# Patient Record
Sex: MALE | Race: WHITE | Employment: FULL TIME | URBAN - METROPOLITAN AREA
[De-identification: names, ages, dates, MRNs, and addresses within clinical notes are randomized per-mention and may not be internally consistent; named-entity substitution may affect disease eponyms.]

---

## 2018-01-24 PROBLEM — M24.051 LOOSE BODY IN RIGHT HIP: Status: RESOLVED | Noted: 2017-02-13 | Resolved: 2018-01-24

## 2018-01-24 PROBLEM — S83.271A COMPLEX TEAR OF LATERAL MENISCUS OF RIGHT KNEE AS CURRENT INJURY: Status: ACTIVE | Noted: 2017-11-17

## 2018-01-24 PROBLEM — M24.051 LOOSE BODY IN RIGHT HIP: Status: ACTIVE | Noted: 2017-02-13

## 2018-03-22 PROBLEM — M25.9 MULTIPLE JOINT COMPLAINTS: Status: ACTIVE | Noted: 2018-03-22

## 2018-03-22 PROBLEM — R21 RASH AND NONSPECIFIC SKIN ERUPTION: Status: ACTIVE | Noted: 2018-03-22

## 2018-03-22 PROBLEM — R76.8 CYCLIC CITRULLINATED PEPTIDE (CCP) ANTIBODY POSITIVE: Status: ACTIVE | Noted: 2018-03-22

## 2019-01-28 ENCOUNTER — HOSPITAL ENCOUNTER (OUTPATIENT)
Dept: LAB | Age: 49
Discharge: HOME | End: 2019-01-28

## 2019-01-28 PROCEDURE — 88305 TISSUE EXAM BY PATHOLOGIST: CPT

## 2019-01-30 PROBLEM — K44.9 HIATAL HERNIA: Status: ACTIVE | Noted: 2019-01-30

## 2019-04-18 PROBLEM — R39.16 BENIGN PROSTATIC HYPERPLASIA (BPH) WITH STRAINING ON URINATION: Status: ACTIVE | Noted: 2019-04-18

## 2019-04-18 PROBLEM — R21 RASH AND NONSPECIFIC SKIN ERUPTION: Status: RESOLVED | Noted: 2018-03-22 | Resolved: 2019-04-18

## 2019-04-18 PROBLEM — N40.1 BENIGN PROSTATIC HYPERPLASIA (BPH) WITH STRAINING ON URINATION: Status: ACTIVE | Noted: 2019-04-18

## 2019-04-18 PROBLEM — D12.6 TUBULOVILLOUS ADENOMA POLYP OF COLON: Status: ACTIVE | Noted: 2019-04-18

## 2019-05-03 ENCOUNTER — HOSPITAL ENCOUNTER (OUTPATIENT)
Dept: MRI IMAGING | Age: 49
Discharge: HOME | End: 2019-05-03

## 2019-05-03 DIAGNOSIS — D12.6 TUBULOVILLOUS ADENOMA POLYP OF COLON: ICD-10-CM

## 2019-05-03 DIAGNOSIS — M54.6 CHRONIC MIDLINE THORACIC BACK PAIN: ICD-10-CM

## 2019-05-03 DIAGNOSIS — F17.200 SMOKER: ICD-10-CM

## 2019-05-03 DIAGNOSIS — G89.29 CHRONIC MIDLINE THORACIC BACK PAIN: ICD-10-CM

## 2019-05-03 DIAGNOSIS — R63.4 ABNORMAL WEIGHT LOSS: ICD-10-CM

## 2019-05-04 NOTE — PROGRESS NOTES
Pt was claustrophobic and unable to complete MRI this evening. Informed patient to call ordering physician for further instruction.

## 2019-05-11 ENCOUNTER — HOSPITAL ENCOUNTER (OUTPATIENT)
Dept: CT IMAGING | Age: 49
Discharge: HOME | End: 2019-05-11
Payer: COMMERCIAL

## 2019-05-11 DIAGNOSIS — F17.200 SMOKER: ICD-10-CM

## 2019-05-11 DIAGNOSIS — R63.4 ABNORMAL WEIGHT LOSS: ICD-10-CM

## 2019-05-11 DIAGNOSIS — R76.8 CYCLIC CITRULLINATED PEPTIDE (CCP) ANTIBODY POSITIVE: ICD-10-CM

## 2019-05-11 PROCEDURE — 74011636320 HC RX REV CODE- 636/320: Performed by: PHYSICIAN ASSISTANT

## 2019-05-11 PROCEDURE — 74011000258 HC RX REV CODE- 258: Performed by: PHYSICIAN ASSISTANT

## 2019-05-11 PROCEDURE — 74177 CT ABD & PELVIS W/CONTRAST: CPT

## 2019-05-11 RX ORDER — SODIUM CHLORIDE 0.9 % (FLUSH) 0.9 %
10 SYRINGE (ML) INJECTION
Status: COMPLETED | OUTPATIENT
Start: 2019-05-11 | End: 2019-05-11

## 2019-05-11 RX ADMIN — Medication 10 ML: at 11:10

## 2019-05-11 RX ADMIN — IOPAMIDOL 100 ML: 755 INJECTION, SOLUTION INTRAVENOUS at 11:10

## 2019-05-11 RX ADMIN — DIATRIZOATE MEGLUMINE AND DIATRIZOATE SODIUM 15 ML: 660; 100 LIQUID ORAL; RECTAL at 11:10

## 2019-05-11 RX ADMIN — SODIUM CHLORIDE 100 ML: 900 INJECTION, SOLUTION INTRAVENOUS at 11:10

## 2019-05-12 NOTE — PROGRESS NOTES
Please ask him to come in to discuss/review results of the CT of his abdomen. Basically, normal, but will need a prostate exam due to nodular prostate noted. See MRI of shoulder notes also.

## 2019-05-20 ENCOUNTER — HOSPITAL ENCOUNTER (OUTPATIENT)
Dept: MRI IMAGING | Age: 49
Discharge: HOME | End: 2019-05-20
Payer: COMMERCIAL

## 2019-05-20 PROCEDURE — 72146 MRI CHEST SPINE W/O DYE: CPT

## 2019-07-30 ENCOUNTER — HOSPITAL ENCOUNTER (OUTPATIENT)
Dept: ULTRASOUND IMAGING | Age: 49
Discharge: HOME | End: 2019-07-30
Attending: UROLOGY
Payer: COMMERCIAL

## 2019-07-30 DIAGNOSIS — N50.89 TESTICULAR MASS: ICD-10-CM

## 2019-07-30 PROCEDURE — 76870 US EXAM SCROTUM: CPT

## 2019-08-01 NOTE — PROGRESS NOTES
Called patient with scrotal US results. Scrotal US shows R testicular mass concerning for cancer. I recommended that we proceed with right radical inguinal orchiectomy in the OR. He also will need tumor markers (AFP, HCG, LDH) prior to surgery and these orders were placed today. Surgery scheduler will call him to schedule surgery.

## 2019-08-13 ENCOUNTER — HOSPITAL ENCOUNTER (OUTPATIENT)
Age: 49
Setting detail: OUTPATIENT SURGERY
Discharge: HOME | End: 2019-08-13
Attending: UROLOGY | Admitting: UROLOGY
Payer: COMMERCIAL

## 2019-08-13 ENCOUNTER — ANESTHESIA EVENT (OUTPATIENT)
Dept: SURGERY | Age: 49
End: 2019-08-13
Payer: COMMERCIAL

## 2019-08-13 ENCOUNTER — ANESTHESIA (OUTPATIENT)
Dept: SURGERY | Age: 49
End: 2019-08-13
Payer: COMMERCIAL

## 2019-08-13 VITALS
TEMPERATURE: 97.9 F | RESPIRATION RATE: 19 BRPM | BODY MASS INDEX: 22.95 KG/M2 | SYSTOLIC BLOOD PRESSURE: 117 MMHG | DIASTOLIC BLOOD PRESSURE: 65 MMHG | WEIGHT: 151.44 LBS | HEIGHT: 68 IN | HEART RATE: 75 BPM | OXYGEN SATURATION: 98 %

## 2019-08-13 DIAGNOSIS — N50.89 TESTICULAR MASS: Primary | ICD-10-CM

## 2019-08-13 LAB — HGB BLD-MCNC: 12 G/DL (ref 13.6–17.2)

## 2019-08-13 PROCEDURE — 74011000250 HC RX REV CODE- 250

## 2019-08-13 PROCEDURE — 74011250637 HC RX REV CODE- 250/637: Performed by: ANESTHESIOLOGY

## 2019-08-13 PROCEDURE — 77030019908 HC STETH ESOPH SIMS -A: Performed by: ANESTHESIOLOGY

## 2019-08-13 PROCEDURE — 74011250636 HC RX REV CODE- 250/636: Performed by: ANESTHESIOLOGY

## 2019-08-13 PROCEDURE — 77030039425 HC BLD LARYNG TRULITE DISP TELE -A: Performed by: ANESTHESIOLOGY

## 2019-08-13 PROCEDURE — 77030037088 HC TUBE ENDOTRACH ORAL NSL COVD-A: Performed by: ANESTHESIOLOGY

## 2019-08-13 PROCEDURE — 74011250636 HC RX REV CODE- 250/636

## 2019-08-13 PROCEDURE — 77030002996 HC SUT SLK J&J -A: Performed by: UROLOGY

## 2019-08-13 PROCEDURE — 77030002933 HC SUT MCRYL J&J -A: Performed by: UROLOGY

## 2019-08-13 PROCEDURE — 77030019940 HC BLNKT HYPOTHRM STRY -B: Performed by: ANESTHESIOLOGY

## 2019-08-13 PROCEDURE — 77030018836 HC SOL IRR NACL ICUM -A: Performed by: UROLOGY

## 2019-08-13 PROCEDURE — 76210000006 HC OR PH I REC 0.5 TO 1 HR: Performed by: UROLOGY

## 2019-08-13 PROCEDURE — 88305 TISSUE EXAM BY PATHOLOGIST: CPT

## 2019-08-13 PROCEDURE — 76010000162 HC OR TIME 1.5 TO 2 HR INTENSV-TIER 1: Performed by: UROLOGY

## 2019-08-13 PROCEDURE — 76060000034 HC ANESTHESIA 1.5 TO 2 HR: Performed by: UROLOGY

## 2019-08-13 PROCEDURE — 74011250636 HC RX REV CODE- 250/636: Performed by: UROLOGY

## 2019-08-13 PROCEDURE — 77030031139 HC SUT VCRL2 J&J -A: Performed by: UROLOGY

## 2019-08-13 PROCEDURE — 76210000020 HC REC RM PH II FIRST 0.5 HR: Performed by: UROLOGY

## 2019-08-13 PROCEDURE — 77030032490 HC SLV COMPR SCD KNE COVD -B: Performed by: UROLOGY

## 2019-08-13 PROCEDURE — 77030012406 HC DRN WND PENRS BARD -A: Performed by: UROLOGY

## 2019-08-13 PROCEDURE — 77030039266 HC ADH SKN EXOFIN S2SG -A: Performed by: UROLOGY

## 2019-08-13 PROCEDURE — 85018 HEMOGLOBIN: CPT

## 2019-08-13 PROCEDURE — 74011000250 HC RX REV CODE- 250: Performed by: UROLOGY

## 2019-08-13 RX ORDER — PROPOFOL 10 MG/ML
INJECTION, EMULSION INTRAVENOUS AS NEEDED
Status: DISCONTINUED | OUTPATIENT
Start: 2019-08-13 | End: 2019-08-13 | Stop reason: HOSPADM

## 2019-08-13 RX ORDER — ROCURONIUM BROMIDE 10 MG/ML
INJECTION, SOLUTION INTRAVENOUS AS NEEDED
Status: DISCONTINUED | OUTPATIENT
Start: 2019-08-13 | End: 2019-08-13 | Stop reason: HOSPADM

## 2019-08-13 RX ORDER — ACETAMINOPHEN 500 MG
1000 TABLET ORAL ONCE
Status: COMPLETED | OUTPATIENT
Start: 2019-08-13 | End: 2019-08-13

## 2019-08-13 RX ORDER — KETOROLAC TROMETHAMINE 10 MG/1
10 TABLET, FILM COATED ORAL
Qty: 20 TAB | Refills: 0 | Status: SHIPPED | OUTPATIENT
Start: 2019-08-13 | End: 2019-08-18

## 2019-08-13 RX ORDER — BUPIVACAINE HYDROCHLORIDE AND EPINEPHRINE 5; 5 MG/ML; UG/ML
INJECTION, SOLUTION EPIDURAL; INTRACAUDAL; PERINEURAL AS NEEDED
Status: DISCONTINUED | OUTPATIENT
Start: 2019-08-13 | End: 2019-08-13 | Stop reason: HOSPADM

## 2019-08-13 RX ORDER — DEXAMETHASONE SODIUM PHOSPHATE 4 MG/ML
INJECTION, SOLUTION INTRA-ARTICULAR; INTRALESIONAL; INTRAMUSCULAR; INTRAVENOUS; SOFT TISSUE AS NEEDED
Status: DISCONTINUED | OUTPATIENT
Start: 2019-08-13 | End: 2019-08-13 | Stop reason: HOSPADM

## 2019-08-13 RX ORDER — SODIUM CHLORIDE, SODIUM LACTATE, POTASSIUM CHLORIDE, CALCIUM CHLORIDE 600; 310; 30; 20 MG/100ML; MG/100ML; MG/100ML; MG/100ML
100 INJECTION, SOLUTION INTRAVENOUS CONTINUOUS
Status: DISCONTINUED | OUTPATIENT
Start: 2019-08-13 | End: 2019-08-13 | Stop reason: HOSPADM

## 2019-08-13 RX ORDER — MIDAZOLAM HYDROCHLORIDE 1 MG/ML
INJECTION, SOLUTION INTRAMUSCULAR; INTRAVENOUS AS NEEDED
Status: DISCONTINUED | OUTPATIENT
Start: 2019-08-13 | End: 2019-08-13 | Stop reason: HOSPADM

## 2019-08-13 RX ORDER — AMOXICILLIN 250 MG
1 CAPSULE ORAL DAILY
Qty: 5 TAB | Refills: 1 | Status: SHIPPED | OUTPATIENT
Start: 2019-08-13 | End: 2019-08-18

## 2019-08-13 RX ORDER — KETOROLAC TROMETHAMINE 30 MG/ML
INJECTION, SOLUTION INTRAMUSCULAR; INTRAVENOUS AS NEEDED
Status: DISCONTINUED | OUTPATIENT
Start: 2019-08-13 | End: 2019-08-13 | Stop reason: HOSPADM

## 2019-08-13 RX ORDER — LIDOCAINE HYDROCHLORIDE 10 MG/ML
0.3 INJECTION INFILTRATION; PERINEURAL ONCE
Status: DISCONTINUED | OUTPATIENT
Start: 2019-08-13 | End: 2019-08-13 | Stop reason: HOSPADM

## 2019-08-13 RX ORDER — HYDROCODONE BITARTRATE AND ACETAMINOPHEN 5; 325 MG/1; MG/1
1 TABLET ORAL
Qty: 10 TAB | Refills: 0 | Status: SHIPPED | OUTPATIENT
Start: 2019-08-13 | End: 2019-08-16

## 2019-08-13 RX ORDER — LIDOCAINE HYDROCHLORIDE 20 MG/ML
INJECTION, SOLUTION EPIDURAL; INFILTRATION; INTRACAUDAL; PERINEURAL AS NEEDED
Status: DISCONTINUED | OUTPATIENT
Start: 2019-08-13 | End: 2019-08-13 | Stop reason: HOSPADM

## 2019-08-13 RX ORDER — HYDROMORPHONE HYDROCHLORIDE 2 MG/ML
0.5 INJECTION, SOLUTION INTRAMUSCULAR; INTRAVENOUS; SUBCUTANEOUS
Status: DISCONTINUED | OUTPATIENT
Start: 2019-08-13 | End: 2019-08-13 | Stop reason: HOSPADM

## 2019-08-13 RX ORDER — GLYCOPYRROLATE 0.2 MG/ML
INJECTION INTRAMUSCULAR; INTRAVENOUS AS NEEDED
Status: DISCONTINUED | OUTPATIENT
Start: 2019-08-13 | End: 2019-08-13 | Stop reason: HOSPADM

## 2019-08-13 RX ORDER — ONDANSETRON 2 MG/ML
INJECTION INTRAMUSCULAR; INTRAVENOUS AS NEEDED
Status: DISCONTINUED | OUTPATIENT
Start: 2019-08-13 | End: 2019-08-13 | Stop reason: HOSPADM

## 2019-08-13 RX ORDER — CEFAZOLIN SODIUM/WATER 2 G/20 ML
2 SYRINGE (ML) INTRAVENOUS
Status: COMPLETED | OUTPATIENT
Start: 2019-08-13 | End: 2019-08-13

## 2019-08-13 RX ORDER — OXYCODONE HYDROCHLORIDE 5 MG/1
10 TABLET ORAL
Status: DISCONTINUED | OUTPATIENT
Start: 2019-08-13 | End: 2019-08-13 | Stop reason: HOSPADM

## 2019-08-13 RX ORDER — NEOSTIGMINE METHYLSULFATE 1 MG/ML
INJECTION, SOLUTION INTRAVENOUS AS NEEDED
Status: DISCONTINUED | OUTPATIENT
Start: 2019-08-13 | End: 2019-08-13 | Stop reason: HOSPADM

## 2019-08-13 RX ORDER — EPHEDRINE SULFATE 50 MG/ML
INJECTION, SOLUTION INTRAVENOUS AS NEEDED
Status: DISCONTINUED | OUTPATIENT
Start: 2019-08-13 | End: 2019-08-13 | Stop reason: HOSPADM

## 2019-08-13 RX ORDER — FENTANYL CITRATE 50 UG/ML
INJECTION, SOLUTION INTRAMUSCULAR; INTRAVENOUS AS NEEDED
Status: DISCONTINUED | OUTPATIENT
Start: 2019-08-13 | End: 2019-08-13 | Stop reason: HOSPADM

## 2019-08-13 RX ORDER — MIDAZOLAM HYDROCHLORIDE 1 MG/ML
2 INJECTION, SOLUTION INTRAMUSCULAR; INTRAVENOUS
Status: DISCONTINUED | OUTPATIENT
Start: 2019-08-13 | End: 2019-08-13 | Stop reason: HOSPADM

## 2019-08-13 RX ADMIN — KETOROLAC TROMETHAMINE 30 MG: 30 INJECTION, SOLUTION INTRAMUSCULAR; INTRAVENOUS at 14:23

## 2019-08-13 RX ADMIN — NEOSTIGMINE METHYLSULFATE 3 MG: 1 INJECTION, SOLUTION INTRAVENOUS at 14:35

## 2019-08-13 RX ADMIN — EPHEDRINE SULFATE 10 MG: 50 INJECTION, SOLUTION INTRAVENOUS at 14:12

## 2019-08-13 RX ADMIN — DEXAMETHASONE SODIUM PHOSPHATE 8 MG: 4 INJECTION, SOLUTION INTRA-ARTICULAR; INTRALESIONAL; INTRAMUSCULAR; INTRAVENOUS; SOFT TISSUE at 13:18

## 2019-08-13 RX ADMIN — SODIUM CHLORIDE, SODIUM LACTATE, POTASSIUM CHLORIDE, AND CALCIUM CHLORIDE: 600; 310; 30; 20 INJECTION, SOLUTION INTRAVENOUS at 14:45

## 2019-08-13 RX ADMIN — ACETAMINOPHEN 1000 MG: 500 TABLET, FILM COATED ORAL at 10:44

## 2019-08-13 RX ADMIN — PROPOFOL 50 MG: 10 INJECTION, EMULSION INTRAVENOUS at 14:39

## 2019-08-13 RX ADMIN — EPHEDRINE SULFATE 5 MG: 50 INJECTION, SOLUTION INTRAVENOUS at 14:09

## 2019-08-13 RX ADMIN — ONDANSETRON 4 MG: 2 INJECTION INTRAMUSCULAR; INTRAVENOUS at 13:18

## 2019-08-13 RX ADMIN — LIDOCAINE HYDROCHLORIDE 100 MG: 20 INJECTION, SOLUTION EPIDURAL; INFILTRATION; INTRACAUDAL; PERINEURAL at 13:10

## 2019-08-13 RX ADMIN — GLYCOPYRROLATE 0.4 MG: 0.2 INJECTION INTRAMUSCULAR; INTRAVENOUS at 14:35

## 2019-08-13 RX ADMIN — SODIUM CHLORIDE, SODIUM LACTATE, POTASSIUM CHLORIDE, AND CALCIUM CHLORIDE 100 ML/HR: 600; 310; 30; 20 INJECTION, SOLUTION INTRAVENOUS at 10:44

## 2019-08-13 RX ADMIN — FENTANYL CITRATE 50 MCG: 50 INJECTION, SOLUTION INTRAMUSCULAR; INTRAVENOUS at 14:39

## 2019-08-13 RX ADMIN — PROPOFOL 200 MG: 10 INJECTION, EMULSION INTRAVENOUS at 13:10

## 2019-08-13 RX ADMIN — MIDAZOLAM HYDROCHLORIDE 2 MG: 1 INJECTION, SOLUTION INTRAMUSCULAR; INTRAVENOUS at 12:59

## 2019-08-13 RX ADMIN — EPHEDRINE SULFATE 5 MG: 50 INJECTION, SOLUTION INTRAVENOUS at 14:21

## 2019-08-13 RX ADMIN — FENTANYL CITRATE 100 MCG: 50 INJECTION, SOLUTION INTRAMUSCULAR; INTRAVENOUS at 13:10

## 2019-08-13 RX ADMIN — EPHEDRINE SULFATE 10 MG: 50 INJECTION, SOLUTION INTRAVENOUS at 14:31

## 2019-08-13 RX ADMIN — Medication 2 G: at 13:23

## 2019-08-13 RX ADMIN — ROCURONIUM BROMIDE 40 MG: 10 INJECTION, SOLUTION INTRAVENOUS at 13:11

## 2019-08-13 NOTE — ANESTHESIA PREPROCEDURE EVALUATION
Relevant Problems   No relevant active problems       Anesthetic History   No history of anesthetic complications            Review of Systems / Medical History  Patient summary reviewed and pertinent labs reviewed    Pulmonary          Smoker         Neuro/Psych         Psychiatric history     Cardiovascular                  Exercise tolerance: >4 METS     GI/Hepatic/Renal     GERD (not completely controlled)      Hiatal hernia     Endo/Other  Within defined limits           Other Findings              Physical Exam    Airway  Mallampati: I  TM Distance: > 6 cm  Neck ROM: normal range of motion   Mouth opening: Normal     Cardiovascular    Rhythm: regular  Rate: normal         Dental  No notable dental hx       Pulmonary  Breath sounds clear to auscultation               Abdominal         Other Findings            Anesthetic Plan    ASA: 2  Anesthesia type: general          Induction: Intravenous  Anesthetic plan and risks discussed with: Patient and Spouse

## 2019-08-13 NOTE — PROGRESS NOTES
Spiritual Care Visit, initial visit. Attempted 2x to visit before surgery. Patient was not present. Visit by Magdaleno Sow, Staff .  Marilu., Billy.CHONG., B.A.

## 2019-08-13 NOTE — DISCHARGE INSTRUCTIONS
Patient Education        Orchiectomy: What to Expect at Home  Your Recovery  Orchiectomy (say \"rg-yes-TN-tuh-bijan\") is surgery to remove one or both testicles. This is mainly done to treat testicular cancer or advanced prostate cancer. You can expect to feel better each day, although you may have some mild to moderate pain for several days after surgery. You may need pain medicine during this time. Your scrotum will be swollen after surgery. This is normal. The swelling usually goes down within 2 to 4 weeks. You should be able to do most of your normal activities after 2 to 3 weeks, except for those that require a lot of physical effort. It is important to avoid straining with bowel movements and doing heavy lifting while you are recovering. If both your testicles were removed, you may start to notice changes in your body several weeks after surgery due to not having male hormones. The most obvious changes may be hot flashes and sweating. You may lose your sex drive, gain weight, or not be able to get an erection. These changes can be upsetting. Talk to your doctor about treatments that might help with these. This care sheet gives you a general idea about how long it will take for you to recover. But each person recovers at a different pace. Follow the steps below to get better as quickly as possible. How can you care for yourself at home? Activity    · Rest when you feel tired. Getting enough sleep will help you recover.     · Lie down for 15 minutes several times each day for the first 2 weeks after surgery. This will help reduce the swelling of your scrotum.     · Try to walk each day. Start by walking a little more than you did the day before. Bit by bit, increase the amount you walk.  Walking boosts blood flow and helps prevent pneumonia and constipation.     · Avoid strenuous activities, such as bicycle riding, jogging, weight lifting, or aerobic exercise, for 2 to 3 weeks after surgery.     · Avoid lifting anything that would make you strain. This may include a child, heavy grocery bags and milk containers, a heavy briefcase or backpack, cat litter or dog food bags, or a vacuum .     · Do not drive for 1 to 2 weeks after surgery or until your doctor says it is okay.     · You may take showers. Pat the cut (incision) dry. Do not take a bath for the first 2 weeks, or until your doctor tells you it is okay.     · Your doctor will tell you when you can have sex again.     · Most men are able to return to work or their normal activities in about 2 to 3 weeks after surgery. Diet    · You can eat your normal diet. If your stomach is upset, try bland, low-fat foods like plain rice, broiled chicken, toast, and yogurt.     · You may notice that your bowel movements are not regular right after your surgery. This is common. Try to avoid constipation and straining with bowel movements. You may want to take a fiber supplement every day. If you have not had a bowel movement after a couple of days, ask your doctor about taking a mild laxative. Medicines    · Your doctor will tell you if and when you can restart your medicines. He or she will also give you instructions about taking any new medicines.     · If you take blood thinners, such as warfarin (Coumadin), clopidogrel (Plavix), or aspirin, be sure to talk to your doctor. He or she will tell you if and when to start taking those medicines again. Make sure that you understand exactly what your doctor wants you to do.     · Take pain medicines exactly as directed. ? If the doctor gave you a prescription medicine for pain, take it as prescribed. ? If you are not taking a prescription pain medicine, ask your doctor if you can take an over-the-counter medicine.     · If your doctor prescribed antibiotics, take them as directed. Do not stop taking them just because you feel better.  You need to take the full course of antibiotics.     · If you think your pain medicine is making you sick to your stomach:  ? Take your medicine after meals (unless your doctor has told you not to). ? Ask your doctor for a different pain medicine. Incision care    · In most cases, the doctor will use stitches that dissolve on their own in 1 to 3 weeks and do not need to be removed.     · Wash the area daily with warm, soapy water, and pat it dry. Don't use hydrogen peroxide or alcohol, which can slow healing. Cover the area with a gauze bandage until it stops oozing. Change the bandage at least one time a day. Your underwear holds the bandage in place.     · Keep the area clean and dry.    Ice    · Put ice or a cold pack on your scrotum for 10 to 20 minutes at a time. Try to do this every 1 to 2 hours (when you are awake) for the first 2 or 3 days after surgery. Put a thin cloth between the ice and your skin. Other instructions    · Wear snug briefs or a jockstrap to support your scrotum for the first few weeks after surgery. Follow-up care is a key part of your treatment and safety. Be sure to make and go to all appointments, and call your doctor if you are having problems. It's also a good idea to know your test results and keep a list of the medicines you take. When should you call for help? Call 911 anytime you think you may need emergency care. For example, call if:    · You passed out (lost consciousness).     · You are short of breath.    Call your doctor now or seek immediate medical care if:    · You have pain that does not get better after you take pain medicine.     · You have loose stitches, or your incision comes open.     · Bright red blood has soaked through the bandage over your incision.     · You cannot pass stools or gas.     · You are sick to your stomach or cannot drink fluids.     · You have signs of a blood clot in your leg (called a deep vein thrombosis), such as:  ? Pain in your calf, back of the knee, thigh, or groin. ?  Redness or swelling in your leg.     · You have signs of infection, such as:  ? Increased pain, swelling, warmth, or redness. ? Red streaks leading from the incision. ? Pus draining from the incision. ? A fever.    Watch closely for any changes in your health, and be sure to contact your doctor if you have any problems. Where can you learn more? Go to http://miryam-fiona.info/. Enter 29 [id number] [id number] in the search box to learn more about \"Orchiectomy: What to Expect at Home. \"  Current as of: December 19, 2018  Content Version: 12.1  © 7977-0572 [employer], [employer]. Care instructions adapted under license by [employer] (which disclaims liability or warranty for this information). If you have questions about a medical condition or this instruction, always ask your healthcare professional. [address] any warranty or liability for your use of this information.

## 2019-08-13 NOTE — BRIEF OP NOTE
BRIEF OPERATIVE NOTE    Date of Procedure: 8/13/2019   Preoperative Diagnosis: Testicular mass [N50.9]  Postoperative Diagnosis: Testicular mass [N50.9]    Procedure(s):  RIGHT INGUINAL ORCHIECTOMY RADICAL  Surgeon(s) and Role:     Magdalene Soto MD - Primary         Surgical Assistant: None    Surgical Staff:  Circ-1: Ginette Saha RN  Scrub Tech-1: Gloria TRAN  Event Time In Time Out   Incision Start 1333    Incision Close 1440      Anesthesia: General   Estimated Blood Loss: 15 cc  Specimens:   ID Type Source Tests Collected by Time Destination   1 : right testicle and spermatic cord Preservative Testicle  Lila Ritchie MD 8/13/2019 1417 Pathology      Findings: Firm R testicular mass concerning for malignancy.   Grossly negative margins   Complications: None   Implants: * No implants in log *

## 2019-08-13 NOTE — ANESTHESIA POSTPROCEDURE EVALUATION
Procedure(s):  RIGHT INGUINAL ORCHIECTOMY RADICAL. general    Anesthesia Post Evaluation      Multimodal analgesia: multimodal analgesia used between 6 hours prior to anesthesia start to PACU discharge  Patient location during evaluation: PACU  Patient participation: complete - patient participated  Level of consciousness: awake  Pain management: adequate  Airway patency: patent  Anesthetic complications: no  Cardiovascular status: acceptable  Respiratory status: acceptable  Hydration status: acceptable  Post anesthesia nausea and vomiting:  none      Vitals Value Taken Time   /71 8/13/2019  3:22 PM   Temp 36.6 °C (97.9 °F) 8/13/2019  3:22 PM   Pulse 71 8/13/2019  3:24 PM   Resp 18 8/13/2019  3:22 PM   SpO2 100 % 8/13/2019  3:24 PM   Vitals shown include unvalidated device data.

## 2019-08-14 NOTE — OP NOTES
[facility]  OPERATIVE REPORT    Name:  Vickie Mcpherson  MR#:  [id number]  :  1970  ACCOUNT #:  [de-identified]  DATE OF SERVICE:  2019      PREOPERATIVE DIAGNOSIS:  Right testicular mass. POSTOPERATIVE DIAGNOSIS:  Right testicular mass. OPERATIONS AND PROCEDURES:  Right radical inguinal orchiectomy. SURGEON:  Lalito Villanueva MD    ASSISTANT:  None. ANESTHESIA:  General.    ESTIMATED BLOOD LOSS:  15 mL. SPECIMENS REMOVED:  Right testicle and spermatic cord. FINDINGS:  Firm right testicular mass concerning for malignancy, grossly negative margins. COMPLICATIONS:  None. IMPLANTS:  None. INDICATIONS FOR OPERATIVE PROCEDURE:  The patient is a 26-year-old gentleman with a family history of testicular cancer found to have a concerning firm right testicular mass on physical exam.  Scrotal ultrasound was concerning for malignancy and he therefore was taken to the operating room today for right radical inguinal orchiectomy. DESCRIPTION OF PROCEDURE:  After informed consent was obtained and the correct patient was identified in the preoperative holding area, he was taken back to the operating room suite and placed on the table in the supine position. Time-out was performed confirming the correct patient and planned procedure. We also confirmed that he was marked on the right side and that the right testicular mass was palpable. After time-out was performed, general anesthesia was induced. He received a 2 g of IV Ancef. He was then left in the supine position and prepped and draped in the usual sterile fashion.   I began the case by making an incision parallel to the inguinal ligament 2 cm superior to the pubic tubercle and 2 cm lateral.  I extended this through the site of his prior right inguinal scar from his prior right inguinal hernia repair and then used a combination of blunt and sharp dissection to carry my dissection downwards through the Rony fascia and exposed the external oblique fascia. Once the fascia was exposed, I then made a small incision using my 15 blade scalpel. I then used a Kittner to try to bluntly dissect the cord from the fascia. Unfortunately, the patient had a prior right inguinal hernia repair with mesh and the mesh had to be gone through on the way down to the external oblique fascia. This created a lot of scarring and it was difficult to free the spermatic cord up from the patient's fascia due to his prior hernia repair and scar tissue. After careful dissection, I was able to free the cord up. I did identify what appeared to be the ilioinguinal nerve and this was retracted laterally from the spermatic cord for safekeeping. I was eventually able to use a [place] and circumferentially get around the cord. I then placed the Penrose drain twice around the cord to perform the Pott's maneuver. I then continued to open my external oblique fascia down to the external ring freeing the cord up inferiorly as I went. I then was able to deliver the testicle from the scrotum into the inguinal wound. I used a combination of blunt and sharp dissection to take down the gubernacular attachments. I then used a 0 silk stick tie to clamp the gubernaculum and stick tie it for hemostasis. I then cut the gubernaculum and cauterized all bleeders to achieve adequate hemostasis down in the scrotal portion. There was no scrotal violation and no violation of the tunica vaginalis during this portion of the case. Once the testicle was completely free from its gubernacular attachments and delivered out into the inguinal wound, I then freed it up superiorly towards the internal inguinal ring. I then carefully came through the surrounding tissue of the spermatic cord taking care to avoid the spermatic vessels. I did isolate the vas deferens and tied this off using two 0 silk hand ties.   I then cut the vas deferens near the internal inguinal ring and  it from the spermatic vessels. Next, I continue to free up the cremasteric fibers and surrounding attachments of the cord around the spermatic vessels. Once I had thinned them out to where I just had a vein and artery exposed, I placed two hemostat clamps across the spermatic cord. I then suture ligated the cord using a 0 silk stick tie. I then used two free 0 silk hand ties as well to ligate the cord. I then slowly came off one clamp at a time and observed for adequate hemostasis after first cutting the right testicle and spermatic cord free. Hemostasis was observed. I then tucked the cord back into the inguinal canal and pushed it towards the internal inguinal ring. I left the silk ties long in case the patient ever needed future RPLND surgery. I then copiously irrigated the wound. I did not identify any further bleeders and once hemostasis was achieved, I then used a 2-0 Vicryl to reapproximate the external oblique fascia down towards the external ring. I took great care to avoid the ilioinguinal nerve and visualized it throughout my closure. After I  had reapproximated the external oblique fascia with 2-0 Vicryl, I then used another 2-0 Vicryl to close the Rony fascia and a 4-0 Monocryl in a running subcuticular fashion to close the skin. I injected 0.5% Marcaine for local pain control. Dermabond was applied to the wound and the patient was awoken from anesthesia. He was transferred to the PACU in stable condition. He tolerated the procedure well. There were no complications. All counts were correct at the end of procedure.       Gracie Quevedo MD      [id number]  D:  08/13/2019 14:55  T:  08/13/2019 15:00  JOB #:  [id number]

## 2019-08-16 NOTE — PROGRESS NOTES
Called patient with pathology results. 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 I ordered CT C/A/P for complete staging and will see him back on 8/26/19 after the imaging is completed to discuss management options and final staging.

## 2019-08-26 PROBLEM — C62.11 MALIGNANT NEOPLASM OF DESCENDED RIGHT TESTIS (HCC): Status: ACTIVE | Noted: 2019-08-26

## 2019-09-03 ENCOUNTER — HOSPITAL ENCOUNTER (OUTPATIENT)
Dept: CT IMAGING | Age: 49
End: 2019-09-03
Attending: UROLOGY
Payer: COMMERCIAL

## 2019-09-03 ENCOUNTER — HOSPITAL ENCOUNTER (OUTPATIENT)
Dept: CT IMAGING | Age: 49
Discharge: HOME | End: 2019-09-03
Attending: UROLOGY
Payer: COMMERCIAL

## 2019-09-03 DIAGNOSIS — C62.11 MALIGNANT NEOPLASM OF DESCENDED RIGHT TESTIS (HCC): ICD-10-CM

## 2019-09-03 PROCEDURE — 74011000258 HC RX REV CODE- 258: Performed by: UROLOGY

## 2019-09-03 PROCEDURE — 74177 CT ABD & PELVIS W/CONTRAST: CPT

## 2019-09-03 PROCEDURE — 74011636320 HC RX REV CODE- 636/320: Performed by: UROLOGY

## 2019-09-03 RX ORDER — SODIUM CHLORIDE 0.9 % (FLUSH) 0.9 %
10 SYRINGE (ML) INJECTION
Status: COMPLETED | OUTPATIENT
Start: 2019-09-03 | End: 2019-09-03

## 2019-09-03 RX ADMIN — DIATRIZOATE MEGLUMINE AND DIATRIZOATE SODIUM 15 ML: 660; 100 LIQUID ORAL; RECTAL at 16:35

## 2019-09-03 RX ADMIN — Medication 10 ML: at 16:34

## 2019-09-03 RX ADMIN — IOPAMIDOL 100 ML: 755 INJECTION, SOLUTION INTRAVENOUS at 16:34

## 2019-09-03 RX ADMIN — SODIUM CHLORIDE 100 ML: 900 INJECTION, SOLUTION INTRAVENOUS at 16:34

## 2019-12-05 ENCOUNTER — HOSPITAL ENCOUNTER (OUTPATIENT)
Dept: CT IMAGING | Age: 49
Discharge: HOME | End: 2019-12-05
Attending: UROLOGY
Payer: COMMERCIAL

## 2019-12-05 DIAGNOSIS — C62.11 MALIGNANT NEOPLASM OF DESCENDED RIGHT TESTIS (HCC): ICD-10-CM

## 2019-12-05 PROCEDURE — 74011636320 HC RX REV CODE- 636/320: Performed by: UROLOGY

## 2019-12-05 PROCEDURE — 74177 CT ABD & PELVIS W/CONTRAST: CPT

## 2019-12-05 PROCEDURE — 74011000258 HC RX REV CODE- 258: Performed by: UROLOGY

## 2019-12-05 RX ORDER — SODIUM CHLORIDE 0.9 % (FLUSH) 0.9 %
10 SYRINGE (ML) INJECTION
Status: COMPLETED | OUTPATIENT
Start: 2019-12-05 | End: 2019-12-05

## 2019-12-05 RX ADMIN — Medication 10 ML: at 16:05

## 2019-12-05 RX ADMIN — DIATRIZOATE MEGLUMINE AND DIATRIZOATE SODIUM 15 ML: 660; 100 LIQUID ORAL; RECTAL at 16:05

## 2019-12-05 RX ADMIN — IOPAMIDOL 100 ML: 755 INJECTION, SOLUTION INTRAVENOUS at 16:05

## 2019-12-05 RX ADMIN — SODIUM CHLORIDE 100 ML: 900 INJECTION, SOLUTION INTRAVENOUS at 16:05

## 2020-06-16 ENCOUNTER — HOSPITAL ENCOUNTER (OUTPATIENT)
Dept: CT IMAGING | Age: 50
Discharge: HOME | End: 2020-06-16
Attending: UROLOGY
Payer: COMMERCIAL

## 2020-06-16 DIAGNOSIS — C62.90 MALIGNANT NEOPLASM OF TESTICLE (HCC): ICD-10-CM

## 2020-06-16 PROCEDURE — 74011636320 HC RX REV CODE- 636/320: Performed by: UROLOGY

## 2020-06-16 PROCEDURE — 74011000258 HC RX REV CODE- 258: Performed by: UROLOGY

## 2020-06-16 PROCEDURE — 74177 CT ABD & PELVIS W/CONTRAST: CPT

## 2020-06-16 RX ORDER — SODIUM CHLORIDE 0.9 % (FLUSH) 0.9 %
10 SYRINGE (ML) INJECTION
Status: COMPLETED | OUTPATIENT
Start: 2020-06-16 | End: 2020-06-16

## 2020-06-16 RX ADMIN — SODIUM CHLORIDE 100 ML: 900 INJECTION, SOLUTION INTRAVENOUS at 14:53

## 2020-06-16 RX ADMIN — DIATRIZOATE MEGLUMINE AND DIATRIZOATE SODIUM 15 ML: 660; 100 LIQUID ORAL; RECTAL at 14:54

## 2020-06-16 RX ADMIN — IOPAMIDOL 100 ML: 755 INJECTION, SOLUTION INTRAVENOUS at 14:54

## 2020-06-16 RX ADMIN — Medication 10 ML: at 14:53

## 2020-06-16 NOTE — PROGRESS NOTES
Will need to discuss CT findings with patient and examine patient in follow up. Britton Bermudez, please call patient and schedule him for next available follow up with me. Thanks!

## 2020-08-31 ENCOUNTER — PATIENT OUTREACH (OUTPATIENT)
Dept: CASE MANAGEMENT | Age: 50
End: 2020-08-31

## 2020-08-31 NOTE — PROGRESS NOTES
8/31/20 saw pt today with Dr. Sidney Beasley for initial medical oncology consult for testicular cancer with new lung nodules noted on CT. Wife is with him today. Initial diagnosis of testicular cancer was in 2019. He is not having any issues. PO intake is good. Imaging is from 7/20. Plan is to repeat scans and tumor markers. Provided opportunity to ask questions and all were discussed. Navigation will continue to follow.

## 2020-09-08 ENCOUNTER — HOSPITAL ENCOUNTER (OUTPATIENT)
Dept: CT IMAGING | Age: 50
Discharge: HOME | End: 2020-09-08
Attending: INTERNAL MEDICINE
Payer: COMMERCIAL

## 2020-09-08 DIAGNOSIS — Z85.47 HISTORY OF TESTICULAR CANCER: ICD-10-CM

## 2020-09-08 DIAGNOSIS — R91.1 LUNG NODULE SEEN ON IMAGING STUDY: ICD-10-CM

## 2020-09-08 PROCEDURE — 71260 CT THORAX DX C+: CPT

## 2020-09-08 PROCEDURE — 74011000636 HC RX REV CODE- 636: Performed by: INTERNAL MEDICINE

## 2020-09-08 RX ORDER — SODIUM CHLORIDE 0.9 % (FLUSH) 0.9 %
10 SYRINGE (ML) INJECTION
Status: COMPLETED | OUTPATIENT
Start: 2020-09-08 | End: 2020-09-08

## 2020-09-08 RX ADMIN — Medication 10 ML: at 10:52

## 2020-09-08 RX ADMIN — IOPAMIDOL 100 ML: 755 INJECTION, SOLUTION INTRAVENOUS at 10:52

## 2020-09-15 ENCOUNTER — PATIENT OUTREACH (OUTPATIENT)
Dept: CASE MANAGEMENT | Age: 50
End: 2020-09-15

## 2020-09-15 ENCOUNTER — HOSPITAL ENCOUNTER (OUTPATIENT)
Dept: LAB | Age: 50
Discharge: HOME | End: 2020-09-15
Payer: COMMERCIAL

## 2020-09-15 DIAGNOSIS — Z85.47 HISTORY OF TESTICULAR CANCER: ICD-10-CM

## 2020-09-15 LAB
AFP-TM SERPL-MCNC: 2.6 NG/ML
ALBUMIN SERPL-MCNC: 3.5 G/DL (ref 3.5–5)
ALBUMIN/GLOB SERPL: 1 {RATIO} (ref 1.2–3.5)
ALP SERPL-CCNC: 117 U/L (ref 50–136)
ALT SERPL-CCNC: 23 U/L (ref 12–65)
ANION GAP SERPL CALC-SCNC: 4 MMOL/L (ref 7–16)
AST SERPL-CCNC: 21 U/L (ref 15–37)
BASOPHILS # BLD: 0 K/UL (ref 0–0.2)
BASOPHILS NFR BLD: 1 % (ref 0–2)
BILIRUB SERPL-MCNC: 0.4 MG/DL (ref 0.2–1.1)
BUN SERPL-MCNC: 10 MG/DL (ref 6–23)
CALCIUM SERPL-MCNC: 8.1 MG/DL (ref 8.3–10.4)
CHLORIDE SERPL-SCNC: 105 MMOL/L (ref 98–107)
CO2 SERPL-SCNC: 29 MMOL/L (ref 21–32)
CREAT SERPL-MCNC: 1 MG/DL (ref 0.8–1.5)
DIFFERENTIAL METHOD BLD: ABNORMAL
EOSINOPHIL # BLD: 0.2 K/UL (ref 0–0.8)
EOSINOPHIL NFR BLD: 4 % (ref 0.5–7.8)
ERYTHROCYTE [DISTWIDTH] IN BLOOD BY AUTOMATED COUNT: 17.4 % (ref 11.9–14.6)
GLOBULIN SER CALC-MCNC: 3.5 G/DL (ref 2.3–3.5)
GLUCOSE SERPL-MCNC: 105 MG/DL (ref 65–100)
HCG SERPL-ACNC: <1 MIU/ML (ref 0–2)
HCT VFR BLD AUTO: 37.5 % (ref 41.1–50.3)
HGB BLD-MCNC: 11.1 G/DL (ref 13.6–17.2)
IMM GRANULOCYTES # BLD AUTO: 0 K/UL (ref 0–0.5)
IMM GRANULOCYTES NFR BLD AUTO: 1 % (ref 0–5)
LYMPHOCYTES # BLD: 1.7 K/UL (ref 0.5–4.6)
LYMPHOCYTES NFR BLD: 29 % (ref 13–44)
MCH RBC QN AUTO: 22 PG (ref 26.1–32.9)
MCHC RBC AUTO-ENTMCNC: 29.6 G/DL (ref 31.4–35)
MCV RBC AUTO: 74.3 FL (ref 79.6–97.8)
MONOCYTES # BLD: 0.6 K/UL (ref 0.1–1.3)
MONOCYTES NFR BLD: 10 % (ref 4–12)
NEUTS SEG # BLD: 3.4 K/UL (ref 1.7–8.2)
NEUTS SEG NFR BLD: 56 % (ref 43–78)
NRBC # BLD: 0 K/UL (ref 0–0.2)
PLATELET # BLD AUTO: 422 K/UL (ref 150–450)
PMV BLD AUTO: 9.7 FL (ref 9.4–12.3)
POTASSIUM SERPL-SCNC: 4.1 MMOL/L (ref 3.5–5.1)
PROT SERPL-MCNC: 7 G/DL (ref 6.3–8.2)
RBC # BLD AUTO: 5.05 M/UL (ref 4.23–5.67)
SODIUM SERPL-SCNC: 138 MMOL/L (ref 136–145)
WBC # BLD AUTO: 6 K/UL (ref 4.3–11.1)

## 2020-09-15 PROCEDURE — 85025 COMPLETE CBC W/AUTO DIFF WBC: CPT

## 2020-09-15 PROCEDURE — 82105 ALPHA-FETOPROTEIN SERUM: CPT

## 2020-09-15 PROCEDURE — 80053 COMPREHEN METABOLIC PANEL: CPT

## 2020-09-15 PROCEDURE — 84702 CHORIONIC GONADOTROPIN TEST: CPT

## 2020-09-15 PROCEDURE — 36415 COLL VENOUS BLD VENIPUNCTURE: CPT

## 2020-09-15 NOTE — PROGRESS NOTES
9/15/20 saw pt today with Dr. Sergio Mayo. Repeat scan looks great. Tumor markers still pending. He is feeling and doing well. PO intake is good. Denies any issues. He is going to follow up with urology for labs and imaging. PRN follow up only. Navigation will sign off. Tumor markers WNL.

## 2021-06-04 ENCOUNTER — HOSPITAL ENCOUNTER (OUTPATIENT)
Dept: CT IMAGING | Age: 51
Discharge: HOME | End: 2021-06-04
Attending: UROLOGY
Payer: COMMERCIAL

## 2021-06-04 DIAGNOSIS — C62.90 MALIGNANT NEOPLASM OF TESTICLE, UNSPECIFIED LATERALITY, UNSPECIFIED WHETHER DESCENDED OR UNDESCENDED (HCC): ICD-10-CM

## 2021-06-04 PROCEDURE — 74011000258 HC RX REV CODE- 258: Performed by: UROLOGY

## 2021-06-04 PROCEDURE — 74011000636 HC RX REV CODE- 636: Performed by: UROLOGY

## 2021-06-04 PROCEDURE — 71260 CT THORAX DX C+: CPT

## 2021-06-04 RX ORDER — SODIUM CHLORIDE 0.9 % (FLUSH) 0.9 %
10 SYRINGE (ML) INJECTION
Status: COMPLETED | OUTPATIENT
Start: 2021-06-04 | End: 2021-06-04

## 2021-06-04 RX ADMIN — IOPAMIDOL 80 ML: 755 INJECTION, SOLUTION INTRAVENOUS at 16:00

## 2021-06-04 RX ADMIN — SODIUM CHLORIDE 100 ML: 900 INJECTION, SOLUTION INTRAVENOUS at 15:45

## 2021-06-04 RX ADMIN — Medication 10 ML: at 15:45

## 2021-10-27 ENCOUNTER — HOSPITAL ENCOUNTER (OUTPATIENT)
Dept: CT IMAGING | Age: 51
Discharge: HOME | End: 2021-10-27
Attending: UROLOGY
Payer: COMMERCIAL

## 2021-10-27 DIAGNOSIS — C62.01 MALIGNANT NEOPLASM OF UNDESCENDED RIGHT TESTIS (HCC): ICD-10-CM

## 2021-10-27 PROCEDURE — 74011000258 HC RX REV CODE- 258: Performed by: UROLOGY

## 2021-10-27 PROCEDURE — 74177 CT ABD & PELVIS W/CONTRAST: CPT

## 2021-10-27 PROCEDURE — 74011000636 HC RX REV CODE- 636: Performed by: UROLOGY

## 2021-10-27 RX ORDER — SODIUM CHLORIDE 0.9 % (FLUSH) 0.9 %
10 SYRINGE (ML) INJECTION
Status: COMPLETED | OUTPATIENT
Start: 2021-10-27 | End: 2021-10-27

## 2021-10-27 RX ADMIN — SODIUM CHLORIDE 100 ML: 900 INJECTION, SOLUTION INTRAVENOUS at 09:44

## 2021-10-27 RX ADMIN — DIATRIZOATE MEGLUMINE AND DIATRIZOATE SODIUM 15 ML: 660; 100 LIQUID ORAL; RECTAL at 09:44

## 2021-10-27 RX ADMIN — IOPAMIDOL 100 ML: 755 INJECTION, SOLUTION INTRAVENOUS at 09:43

## 2021-10-27 RX ADMIN — Medication 10 ML: at 09:44

## 2021-10-29 NOTE — PROGRESS NOTES
Allyson, please let Mr. Alejandro Mcgee know that his CT shows NO recurrence of his testicular cancer. He needs follow up in 6 months with CT chest/abd/pelvis and labs prior to appointment. Please schedule this. I have already ordered all imaging/labs. Thanks! Abner

## 2022-03-18 PROBLEM — C62.11 MALIGNANT NEOPLASM OF DESCENDED RIGHT TESTIS (HCC): Status: ACTIVE | Noted: 2019-08-26

## 2022-03-19 PROBLEM — D12.6 TUBULOVILLOUS ADENOMA POLYP OF COLON: Status: ACTIVE | Noted: 2019-04-18

## 2022-03-19 PROBLEM — R39.16 BENIGN PROSTATIC HYPERPLASIA (BPH) WITH STRAINING ON URINATION: Status: ACTIVE | Noted: 2019-04-18

## 2022-03-19 PROBLEM — K44.9 HIATAL HERNIA: Status: ACTIVE | Noted: 2019-01-30

## 2022-03-19 PROBLEM — S83.271A COMPLEX TEAR OF LATERAL MENISCUS OF RIGHT KNEE AS CURRENT INJURY: Status: ACTIVE | Noted: 2017-11-17

## 2022-03-19 PROBLEM — N40.1 BENIGN PROSTATIC HYPERPLASIA (BPH) WITH STRAINING ON URINATION: Status: ACTIVE | Noted: 2019-04-18

## 2022-03-19 PROBLEM — M25.9 MULTIPLE JOINT COMPLAINTS: Status: ACTIVE | Noted: 2018-03-22

## 2022-03-20 PROBLEM — R76.8 CYCLIC CITRULLINATED PEPTIDE (CCP) ANTIBODY POSITIVE: Status: ACTIVE | Noted: 2018-03-22

## 2022-05-03 ENCOUNTER — HOSPITAL ENCOUNTER (OUTPATIENT)
Dept: CT IMAGING | Age: 52
Discharge: HOME | End: 2022-05-03
Attending: UROLOGY
Payer: COMMERCIAL

## 2022-05-03 DIAGNOSIS — C62.10 MALIGNANT NEOPLASM OF DESCENDED TESTIS, UNSPECIFIED LATERALITY (HCC): ICD-10-CM

## 2022-05-03 LAB — CREAT BLD-MCNC: 0.96 MG/DL (ref 0.8–1.5)

## 2022-05-03 PROCEDURE — 74011000636 HC RX REV CODE- 636: Performed by: UROLOGY

## 2022-05-03 PROCEDURE — 74177 CT ABD & PELVIS W/CONTRAST: CPT

## 2022-05-03 PROCEDURE — 82565 ASSAY OF CREATININE: CPT

## 2022-05-03 PROCEDURE — 74011000258 HC RX REV CODE- 258: Performed by: UROLOGY

## 2022-05-03 RX ORDER — SODIUM CHLORIDE 0.9 % (FLUSH) 0.9 %
10 SYRINGE (ML) INJECTION
Status: COMPLETED | OUTPATIENT
Start: 2022-05-03 | End: 2022-05-03

## 2022-05-03 RX ADMIN — SODIUM CHLORIDE 100 ML: 9 INJECTION, SOLUTION INTRAVENOUS at 09:28

## 2022-05-03 RX ADMIN — IOPAMIDOL 100 ML: 755 INJECTION, SOLUTION INTRAVENOUS at 09:28

## 2022-05-03 RX ADMIN — Medication 10 ML: at 09:28

## 2022-05-03 RX ADMIN — DIATRIZOATE MEGLUMINE AND DIATRIZOATE SODIUM 15 ML: 660; 100 LIQUID ORAL; RECTAL at 09:28

## 2022-08-21 ENCOUNTER — HOSPITAL ENCOUNTER (EMERGENCY)
Dept: GENERAL RADIOLOGY | Age: 52
End: 2022-08-21

## 2022-08-21 ENCOUNTER — HOSPITAL ENCOUNTER (OUTPATIENT)
Age: 52
Setting detail: OBSERVATION
Discharge: HOME | End: 2022-08-22
Attending: EMERGENCY MEDICINE | Admitting: FAMILY MEDICINE

## 2022-08-21 DIAGNOSIS — R00.2 PALPITATIONS: ICD-10-CM

## 2022-08-21 DIAGNOSIS — R77.8 ELEVATED TROPONIN: ICD-10-CM

## 2022-08-21 DIAGNOSIS — F19.10 SUBSTANCE ABUSE (HCC): Primary | ICD-10-CM

## 2022-08-21 LAB
ALBUMIN SERPL-MCNC: 3.4 G/DL (ref 3.5–5)
ALBUMIN/GLOB SERPL: 1 {RATIO} (ref 1.2–3.5)
ALP SERPL-CCNC: 95 U/L (ref 50–136)
ALT SERPL-CCNC: 27 U/L (ref 12–65)
AMPHET UR QL SCN: NEGATIVE
AMPHET UR QL SCN: POSITIVE
ANION GAP SERPL CALC-SCNC: 6 MMOL/L (ref 7–16)
AST SERPL-CCNC: 25 U/L (ref 15–37)
BENZODIAZ UR QL: NEGATIVE
BENZODIAZ UR QL: NEGATIVE
BILIRUB SERPL-MCNC: 0.3 MG/DL (ref 0.2–1.1)
BUN SERPL-MCNC: 8 MG/DL (ref 6–23)
CALCIUM SERPL-MCNC: 8.1 MG/DL (ref 8.3–10.4)
CANNABINOIDS UR QL SCN: NEGATIVE
CANNABINOIDS UR QL SCN: NEGATIVE
CHLORIDE SERPL-SCNC: 118 MMOL/L (ref 98–107)
CO2 SERPL-SCNC: 18 MMOL/L (ref 21–32)
COCAINE UR QL SCN: NEGATIVE
COCAINE UR QL SCN: NEGATIVE
CREAT SERPL-MCNC: 1.13 MG/DL (ref 0.8–1.5)
EKG ATRIAL RATE: 116 BPM
EKG DIAGNOSIS: NORMAL
EKG P AXIS: 63 DEGREES
EKG P-R INTERVAL: 116 MS
EKG Q-T INTERVAL: 292 MS
EKG QRS DURATION: 94 MS
EKG QTC CALCULATION (BAZETT): 405 MS
EKG R AXIS: 63 DEGREES
EKG T AXIS: 28 DEGREES
EKG VENTRICULAR RATE: 116 BPM
ERYTHROCYTE [DISTWIDTH] IN BLOOD BY AUTOMATED COUNT: 21.1 % (ref 11.9–14.6)
GLOBULIN SER CALC-MCNC: 3.4 G/DL (ref 2.3–3.5)
GLUCOSE SERPL-MCNC: 108 MG/DL (ref 65–100)
HCT VFR BLD AUTO: 40.7 % (ref 41.1–50.3)
HGB BLD-MCNC: 12.8 G/DL (ref 13.6–17.2)
MAGNESIUM SERPL-MCNC: 2 MG/DL (ref 1.8–2.4)
MCH RBC QN AUTO: 26.2 PG (ref 26.1–32.9)
MCHC RBC AUTO-ENTMCNC: 31.4 G/DL (ref 31.4–35)
MCV RBC AUTO: 83.2 FL (ref 79.6–97.8)
NRBC # BLD: 0 K/UL (ref 0–0.2)
OPIATES UR QL: NEGATIVE
OPIATES UR QL: NEGATIVE
PLATELET # BLD AUTO: 276 K/UL (ref 150–450)
PMV BLD AUTO: 9.3 FL (ref 9.4–12.3)
POTASSIUM SERPL-SCNC: 3.9 MMOL/L (ref 3.5–5.1)
PROT SERPL-MCNC: 6.8 G/DL (ref 6.3–8.2)
RBC # BLD AUTO: 4.89 M/UL (ref 4.23–5.6)
SODIUM SERPL-SCNC: 142 MMOL/L (ref 136–145)
TROPONIN I SERPL HS-MCNC: 16.8 PG/ML (ref 0–14)
TROPONIN I SERPL HS-MCNC: 55.3 PG/ML (ref 0–14)
TROPONIN I SERPL HS-MCNC: 68.2 PG/ML (ref 0–14)
TROPONIN I SERPL HS-MCNC: 74.6 PG/ML (ref 0–14)
TROPONIN I SERPL HS-MCNC: 77.8 PG/ML (ref 0–14)
WBC # BLD AUTO: 5.2 K/UL (ref 4.3–11.1)

## 2022-08-21 PROCEDURE — 80307 DRUG TEST PRSMV CHEM ANLYZR: CPT

## 2022-08-21 PROCEDURE — 96360 HYDRATION IV INFUSION INIT: CPT

## 2022-08-21 PROCEDURE — G0378 HOSPITAL OBSERVATION PER HR: HCPCS

## 2022-08-21 PROCEDURE — 83735 ASSAY OF MAGNESIUM: CPT

## 2022-08-21 PROCEDURE — 2580000003 HC RX 258: Performed by: PHYSICIAN ASSISTANT

## 2022-08-21 PROCEDURE — 84484 ASSAY OF TROPONIN QUANT: CPT

## 2022-08-21 PROCEDURE — 99285 EMERGENCY DEPT VISIT HI MDM: CPT

## 2022-08-21 PROCEDURE — 2580000003 HC RX 258: Performed by: FAMILY MEDICINE

## 2022-08-21 PROCEDURE — 93005 ELECTROCARDIOGRAM TRACING: CPT | Performed by: PHYSICIAN ASSISTANT

## 2022-08-21 PROCEDURE — 94761 N-INVAS EAR/PLS OXIMETRY MLT: CPT

## 2022-08-21 PROCEDURE — 96361 HYDRATE IV INFUSION ADD-ON: CPT

## 2022-08-21 PROCEDURE — 80053 COMPREHEN METABOLIC PANEL: CPT

## 2022-08-21 PROCEDURE — 85027 COMPLETE CBC AUTOMATED: CPT

## 2022-08-21 PROCEDURE — 36415 COLL VENOUS BLD VENIPUNCTURE: CPT

## 2022-08-21 RX ORDER — PROMETHAZINE HYDROCHLORIDE 25 MG/1
12.5 TABLET ORAL EVERY 6 HOURS PRN
Status: DISCONTINUED | OUTPATIENT
Start: 2022-08-21 | End: 2022-08-22 | Stop reason: HOSPADM

## 2022-08-21 RX ORDER — 0.9 % SODIUM CHLORIDE 0.9 %
1000 INTRAVENOUS SOLUTION INTRAVENOUS ONCE
Status: COMPLETED | OUTPATIENT
Start: 2022-08-21 | End: 2022-08-21

## 2022-08-21 RX ORDER — MAGNESIUM HYDROXIDE/ALUMINUM HYDROXICE/SIMETHICONE 120; 1200; 1200 MG/30ML; MG/30ML; MG/30ML
30 SUSPENSION ORAL EVERY 6 HOURS PRN
Status: DISCONTINUED | OUTPATIENT
Start: 2022-08-21 | End: 2022-08-22 | Stop reason: HOSPADM

## 2022-08-21 RX ORDER — ACETAMINOPHEN 650 MG/1
650 SUPPOSITORY RECTAL EVERY 6 HOURS PRN
Status: DISCONTINUED | OUTPATIENT
Start: 2022-08-21 | End: 2022-08-22 | Stop reason: HOSPADM

## 2022-08-21 RX ORDER — FINASTERIDE 5 MG/1
5 TABLET, FILM COATED ORAL DAILY
Status: DISCONTINUED | OUTPATIENT
Start: 2022-08-22 | End: 2022-08-22 | Stop reason: HOSPADM

## 2022-08-21 RX ORDER — SODIUM CHLORIDE 9 MG/ML
INJECTION, SOLUTION INTRAVENOUS PRN
Status: DISCONTINUED | OUTPATIENT
Start: 2022-08-21 | End: 2022-08-22 | Stop reason: HOSPADM

## 2022-08-21 RX ORDER — SODIUM CHLORIDE 0.9 % (FLUSH) 0.9 %
5-40 SYRINGE (ML) INJECTION PRN
Status: DISCONTINUED | OUTPATIENT
Start: 2022-08-21 | End: 2022-08-22 | Stop reason: HOSPADM

## 2022-08-21 RX ORDER — ONDANSETRON 2 MG/ML
4 INJECTION INTRAMUSCULAR; INTRAVENOUS EVERY 6 HOURS PRN
Status: DISCONTINUED | OUTPATIENT
Start: 2022-08-21 | End: 2022-08-22 | Stop reason: HOSPADM

## 2022-08-21 RX ORDER — PANTOPRAZOLE SODIUM 40 MG/1
40 TABLET, DELAYED RELEASE ORAL
Status: DISCONTINUED | OUTPATIENT
Start: 2022-08-22 | End: 2022-08-22 | Stop reason: HOSPADM

## 2022-08-21 RX ORDER — POTASSIUM CHLORIDE 7.45 MG/ML
10 INJECTION INTRAVENOUS PRN
Status: DISCONTINUED | OUTPATIENT
Start: 2022-08-21 | End: 2022-08-22 | Stop reason: HOSPADM

## 2022-08-21 RX ORDER — SODIUM CHLORIDE 9 MG/ML
INJECTION, SOLUTION INTRAVENOUS CONTINUOUS
Status: DISCONTINUED | OUTPATIENT
Start: 2022-08-21 | End: 2022-08-22 | Stop reason: HOSPADM

## 2022-08-21 RX ORDER — MAGNESIUM SULFATE IN WATER 40 MG/ML
2000 INJECTION, SOLUTION INTRAVENOUS PRN
Status: DISCONTINUED | OUTPATIENT
Start: 2022-08-21 | End: 2022-08-22 | Stop reason: HOSPADM

## 2022-08-21 RX ORDER — ENOXAPARIN SODIUM 100 MG/ML
40 INJECTION SUBCUTANEOUS DAILY
Status: DISCONTINUED | OUTPATIENT
Start: 2022-08-22 | End: 2022-08-22 | Stop reason: HOSPADM

## 2022-08-21 RX ORDER — ARIPIPRAZOLE 10 MG/1
10 TABLET ORAL DAILY
Status: DISCONTINUED | OUTPATIENT
Start: 2022-08-22 | End: 2022-08-22 | Stop reason: HOSPADM

## 2022-08-21 RX ORDER — POTASSIUM CHLORIDE 20 MEQ/1
40 TABLET, EXTENDED RELEASE ORAL PRN
Status: DISCONTINUED | OUTPATIENT
Start: 2022-08-21 | End: 2022-08-22 | Stop reason: HOSPADM

## 2022-08-21 RX ORDER — AMITRIPTYLINE HYDROCHLORIDE 50 MG/1
25 TABLET, FILM COATED ORAL NIGHTLY PRN
Status: DISCONTINUED | OUTPATIENT
Start: 2022-08-21 | End: 2022-08-22 | Stop reason: HOSPADM

## 2022-08-21 RX ORDER — 0.9 % SODIUM CHLORIDE 0.9 %
1000 INTRAVENOUS SOLUTION INTRAVENOUS
Status: COMPLETED | OUTPATIENT
Start: 2022-08-21 | End: 2022-08-22

## 2022-08-21 RX ORDER — POLYETHYLENE GLYCOL 3350 17 G/17G
17 POWDER, FOR SOLUTION ORAL DAILY
Status: DISCONTINUED | OUTPATIENT
Start: 2022-08-22 | End: 2022-08-22 | Stop reason: HOSPADM

## 2022-08-21 RX ORDER — TAMSULOSIN HYDROCHLORIDE 0.4 MG/1
0.4 CAPSULE ORAL DAILY
Status: DISCONTINUED | OUTPATIENT
Start: 2022-08-22 | End: 2022-08-22 | Stop reason: HOSPADM

## 2022-08-21 RX ORDER — DULOXETIN HYDROCHLORIDE 60 MG/1
60 CAPSULE, DELAYED RELEASE ORAL DAILY
Status: DISCONTINUED | OUTPATIENT
Start: 2022-08-22 | End: 2022-08-22 | Stop reason: HOSPADM

## 2022-08-21 RX ORDER — SODIUM CHLORIDE 0.9 % (FLUSH) 0.9 %
5-40 SYRINGE (ML) INJECTION EVERY 12 HOURS SCHEDULED
Status: DISCONTINUED | OUTPATIENT
Start: 2022-08-21 | End: 2022-08-22 | Stop reason: HOSPADM

## 2022-08-21 RX ORDER — ACETAMINOPHEN 325 MG/1
650 TABLET ORAL EVERY 6 HOURS PRN
Status: DISCONTINUED | OUTPATIENT
Start: 2022-08-21 | End: 2022-08-22 | Stop reason: HOSPADM

## 2022-08-21 RX ADMIN — SODIUM CHLORIDE 1000 ML: 9 INJECTION, SOLUTION INTRAVENOUS at 18:23

## 2022-08-21 RX ADMIN — SODIUM CHLORIDE, PRESERVATIVE FREE 10 ML: 5 INJECTION INTRAVENOUS at 21:32

## 2022-08-21 RX ADMIN — SODIUM CHLORIDE: 9 INJECTION, SOLUTION INTRAVENOUS at 21:32

## 2022-08-21 RX ADMIN — SODIUM CHLORIDE 1000 ML: 9 INJECTION, SOLUTION INTRAVENOUS at 12:38

## 2022-08-21 ASSESSMENT — PAIN - FUNCTIONAL ASSESSMENT: PAIN_FUNCTIONAL_ASSESSMENT: NONE - DENIES PAIN

## 2022-08-21 ASSESSMENT — LIFESTYLE VARIABLES
HOW MANY STANDARD DRINKS CONTAINING ALCOHOL DO YOU HAVE ON A TYPICAL DAY: PATIENT DOES NOT DRINK
HOW OFTEN DO YOU HAVE A DRINK CONTAINING ALCOHOL: NEVER

## 2022-08-21 ASSESSMENT — ENCOUNTER SYMPTOMS
GASTROINTESTINAL NEGATIVE: 1
RESPIRATORY NEGATIVE: 1

## 2022-08-21 NOTE — ED NOTES
Took report from [place], RN and Vini, [address] , [facility]  08/21/22 [id number]

## 2022-08-21 NOTE — ED PROVIDER NOTES
[facility] Provider Note                   PCP:                DAMI Maurer               Age: 46 y.o. Sex: male       ICD-10-CM    1. Substance abuse ([facility] [address]  F19.10       2. Palpitations  R00.2       3. Elevated troponin  R77.8           DISPOSITION          MDM  Number of Diagnoses or Management Options  Elevated troponin  Palpitations  Substance abuse ([facility] [address]  Diagnosis management comments: Patient is a 51-year-old male who presents with palpitations and jittery feeling after taking kratom. Spoke to poison control who said that it can given amphetamine like reaction at lower doses and then more opiate like reaction at higher doses. Patient's drug screen is positive for amphetamines. His labs initially showed Trope of 16, repeat was 54. Due to this increased repeat EKG was ordered and third troponin ordered. Results pending at this time. I have my partner follow-up on those third troponin results and disposition accordingly. Patient completely asymptomatic, stable at this time with heart rate in the 70s. Risk of Complications, Morbidity, and/or Mortality  Presenting problems: moderate  Diagnostic procedures: moderate  Management options: moderate    Patient Progress  Patient progress: stable       Orders Placed This Encounter   Procedures    CBC    Comprehensive Metabolic Panel    Magnesium    Troponin    Drug Screen Psych, Urine    Troponin    Cardiac Monitor    Pulse Oximetry    POCT Urinalysis no Micro    EKG 12 Lead    EKG 12 Lead    Saline lock IV        Bharti Block is a 46 y.o. male who presents to the Emergency Department with chief complaint of    Chief Complaint   Patient presents with    Irregular Heart Beat      Patient is 51-year-old male with remote history of opiate abuse. Says has been clean for about 14 years. Uses kratom regularly. Usually takes about 5 mg, but today decided to take 8 or 9. Wanted to see if he could get more of a balance. Adamantly denies trying to harm himself. Denies suicidal or homicidal ideation. Called EMS due to jitteriness and heart palpitations. He was given IV fluids and has had improvement of symptoms. With EMS apparently his heart rate was in the 150s, but here it is in the 1 teens. He states he starting to feel much better. Denies chest pain or shortness of breath. Denies using any other drugs. Does not drink alcohol. Review of Systems   Constitutional:  Positive for diaphoresis. HENT: Negative. Respiratory: Negative. Cardiovascular:  Positive for palpitations. Gastrointestinal: Negative. Genitourinary: Negative. All other systems reviewed and are negative.     Past Medical History:   Diagnosis Date    Chronic pain     right shoulder, back, both hips     Depression     GERD (gastroesophageal reflux disease)     controlled with meds    Insomnia     Kidney stone     Migraine     Nicotine vapor product user         Past Surgical History:   Procedure Laterality Date    HERNIA REPAIR Right 1978    KNEE ARTHROSCOPY Bilateral     ORTHOPEDIC SURGERY      artifical disc replacement at L5-S1    ORTHOPEDIC SURGERY      right hip impingement repair, microfracture, hip dysplasia    OTHER SURGICAL HISTORY Left     ankle    SHOULDER ARTHROSCOPY Right 7/22/16    RIGHT SHOULDER ARTHROSCOPY, DISTAL CLAVICLE EXCISION, DECOMPRESSION, BICEPS TENOTOMY    SHOULDER ARTHROSCOPY Left     SLAP repair - and then was out        Family History   Problem Relation Age of Onset    Cancer Mother     Alzheimer's Disease Father     Psychiatric Disorder Father     Stroke Father     Cancer Father     Heart Disease Mother         ATRIAL FIB        Social History     Socioeconomic History    Marital status:      Spouse name: None    Number of children: None    Years of education: None    Highest education level: None   Tobacco Use    Smoking status: Never    Smokeless tobacco: Current     Last attempt to quit: 7/15/2014 Substance and Sexual Activity    Alcohol use: No     Alcohol/week: 0.0 standard drinks    Drug use: No         Amoxicillin-pot clavulanate and Morphine     Previous Medications    AMITRIPTYLINE (ELAVIL) 25 MG TABLET    1 TO 2 TABLETS BY MOUTH AS NEEDED AT BEDTIME    ARIPIPRAZOLE (ABILIFY) 10 MG TABLET    TAKE 1 TABLET BY MOUTH EVERY DAY    ARIPIPRAZOLE (ABILIFY) 5 MG TABLET    daily    DULOXETINE (CYMBALTA) 60 MG EXTENDED RELEASE CAPSULE    TAKE ONE CAPSULE BY MOUTH EVERY DAY    FINASTERIDE (PROSCAR) 5 MG TABLET    TAKE 1 TABLET BY MOUTH EVERY DAY    LANSOPRAZOLE (PREVACID) 30 MG DELAYED RELEASE CAPSULE    TAKE 1 CAPSULE BY MOUTH EVERY OTHER DAY    SILDENAFIL (REVATIO) 20 MG TABLET    TAKE 1 TO 5 TABLETS BY MOUTH AS NEEDED **MAX 5 TABS PER DOSE/DAY**    SUMATRIPTAN (IMITREX) 100 MG TABLET    Take one prn migraine. May repeat in two hours if needed. May take with two Advil Liquigels. TAMSULOSIN (FLOMAX) 0.4 MG CAPSULE    Take 0.4 mg by mouth daily    TESTOSTERONE CYPIONATE (DEPOTESTOTERONE CYPIONATE) 200 MG/ML INJECTION    0.5 ML BY INTRAMUSCULAR ROUTE ONCE EVERY 2 WEEKS. MAX DAILY AMOUNT: 0.5 ML. Vitals signs and nursing note reviewed. Patient Vitals for the past 4 hrs:   Temp Pulse Resp BP SpO2   08/21/22 1348 -- 91 17 127/82 98 %   08/21/22 1222 99.1 °F (37.3 °C) (!) 109 16 (!) 140/80 97 %          Physical Exam  Vitals and nursing note reviewed. Constitutional:       General: He is not in acute distress. Appearance: Normal appearance. He is normal weight. He is diaphoretic (Not actively diaphoretic, but sure is wet from sweating.). He is not ill-appearing or toxic-appearing. HENT:      Head: Normocephalic. Eyes:      Extraocular Movements: Extraocular movements intact. Cardiovascular:      Rate and Rhythm: Regular rhythm. Tachycardia present. Heart sounds: Normal heart sounds. Pulmonary:      Effort: Pulmonary effort is normal.      Breath sounds: Normal breath sounds. Abdominal:      Palpations: Abdomen is soft. Tenderness: There is no abdominal tenderness. Musculoskeletal:         General: No swelling or tenderness. Normal range of motion. Cervical back: Normal range of motion and neck supple. Skin:     General: Skin is warm. Findings: No rash. Neurological:      General: No focal deficit present. Mental Status: He is alert and oriented to person, place, and time. Mental status is at baseline. Psychiatric:         Mood and Affect: Mood normal.         Behavior: Behavior normal.         Thought Content: Thought content normal.        Procedures      Labs Reviewed   CBC - Abnormal; Notable for the following components:       Result Value    Hemoglobin 12.8 (*)     Hematocrit 40.7 (*)     RDW 21.1 (*)     MPV 9.3 (*)     All other components within normal limits   COMPREHENSIVE METABOLIC PANEL - Abnormal; Notable for the following components:    Chloride 118 (*)     CO2 18 (*)     Anion Gap 6 (*)     Glucose 108 (*)     Calcium 8.1 (*)     Albumin 3.4 (*)     Albumin/Globulin Ratio 1.0 (*)     All other components within normal limits   TROPONIN - Abnormal; Notable for the following components:    Troponin, High Sensitivity 16.8 (*)     All other components within normal limits   TROPONIN - Abnormal; Notable for the following components:    Troponin, High Sensitivity 55.3 (*)     All other components within normal limits   MAGNESIUM   DRUG SCREEN PSYCH, URINE   TROPONIN   POCT URINALYSIS DIPSTICK        No orders to display                    ED Course as of 08/21/22 1537   Sun Aug 21, 2022   1239 I spoke to poison control who recommended Symptomatic and supportive care; +/- benzos for symptoms, electrolytes, LFTs, renal fct. Ekg. Recommended obs until back to baseline. They said that at Lower doses kratom can cause stimulant effect; higher doses more likely cause opiate effect. [AMY]   1246 EKG 12 Lead  EKG shows sinus tachycardia rate of 116. Normal axis. Nonspecific ST changes. No STEMI. Artifact present limiting evaluation. [AMY]   1595 Patient feeling fine. Symptoms resolved. HR 83. Will repeat trop and likely DC [AMY]   1341 Amphetamine, Urine: Positive [AMY]   1523 Troponin, High Sensitivity(!): 55.3  Trop bumped from 16 to 55. Patient still asymptomatic at this time. Adamantly denies chest pain/sob/palpitations. Hr in 76s. Will repeat EKG and wait for third trop. Plan to DC if no significant change. [AMY]      ED Course User Index  [AMY] DAMI Hernandez        Voice dictation software was used during the making of this note. This software is not perfect and grammatical and other typographical errors may be present. This note has not been completely proofread for errors.        Katiuska Gonzalez, [id number]  08/21/22 7049

## 2022-08-21 NOTE — ED NOTES
Spoke with Dr. Tonie Garland regarding patient. She would like a repeat troponin at 1900 and a liter of fluids now with his creatinine being 1.13. I have ordered the fluids and will repeat the troponin at 1900. Dr. Anabelle Willis, [facility] hospitalist will evaluate patient then. Patient and wife informed.      DAMI Araujo  08/21/22 4398

## 2022-08-21 NOTE — ED NOTES
Handoff report given to [facility], RN at this time.      Eve Guerra RN  08/21/22 1287

## 2022-08-21 NOTE — ED NOTES
Spoke with Dr. Rivka Kelley, cardiology, we reviewed the history, physical exam, work-up and disposition. He states that he does not think he is having an MI and he can be safely admitted at [address] and they can consult in the morning. Dr. Micheline Ace, hospitalist was consulted. Patient is agreeable to admission. Dr. Panda Brand went to see the patient as well.      DAMI Edmonds  08/21/22 0992

## 2022-08-21 NOTE — ED TRIAGE NOTES
Patient states heart racing, and sweaty after taking kratom - 8-9 pills. Normally takes 5 without issues.   Denies chest pain n/v.  Ems admin 250 ml's IVF  patient  feels some better at resent

## 2022-08-22 VITALS
HEART RATE: 84 BPM | OXYGEN SATURATION: 100 % | HEIGHT: 68 IN | DIASTOLIC BLOOD PRESSURE: 77 MMHG | SYSTOLIC BLOOD PRESSURE: 133 MMHG | RESPIRATION RATE: 17 BRPM | TEMPERATURE: 98.6 F | BODY MASS INDEX: 22.73 KG/M2 | WEIGHT: 150 LBS

## 2022-08-22 LAB
ANION GAP SERPL CALC-SCNC: 2 MMOL/L (ref 7–16)
BASOPHILS # BLD: 0.1 K/UL (ref 0–0.2)
BASOPHILS NFR BLD: 1 % (ref 0–2)
BUN SERPL-MCNC: 10 MG/DL (ref 6–23)
CALCIUM SERPL-MCNC: 7.8 MG/DL (ref 8.3–10.4)
CHLORIDE SERPL-SCNC: 117 MMOL/L (ref 98–107)
CO2 SERPL-SCNC: 25 MMOL/L (ref 21–32)
CREAT SERPL-MCNC: 0.87 MG/DL (ref 0.8–1.5)
DIFFERENTIAL METHOD BLD: ABNORMAL
EKG ATRIAL RATE: 77 BPM
EKG DIAGNOSIS: NORMAL
EKG P AXIS: 79 DEGREES
EKG P-R INTERVAL: 140 MS
EKG Q-T INTERVAL: 358 MS
EKG QRS DURATION: 84 MS
EKG QTC CALCULATION (BAZETT): 405 MS
EKG R AXIS: 58 DEGREES
EKG T AXIS: 69 DEGREES
EKG VENTRICULAR RATE: 77 BPM
EOSINOPHIL # BLD: 0.2 K/UL (ref 0–0.8)
EOSINOPHIL NFR BLD: 4 % (ref 0.5–7.8)
ERYTHROCYTE [DISTWIDTH] IN BLOOD BY AUTOMATED COUNT: 20.9 % (ref 11.9–14.6)
GLUCOSE SERPL-MCNC: 88 MG/DL (ref 65–100)
HCT VFR BLD AUTO: 37.9 % (ref 41.1–50.3)
HGB BLD-MCNC: 11.8 G/DL (ref 13.6–17.2)
IMM GRANULOCYTES # BLD AUTO: 0 K/UL (ref 0–0.5)
IMM GRANULOCYTES NFR BLD AUTO: 0 % (ref 0–5)
LYMPHOCYTES # BLD: 2.1 K/UL (ref 0.5–4.6)
LYMPHOCYTES NFR BLD: 39 % (ref 13–44)
MCH RBC QN AUTO: 26 PG (ref 26.1–32.9)
MCHC RBC AUTO-ENTMCNC: 31.1 G/DL (ref 31.4–35)
MCV RBC AUTO: 83.5 FL (ref 79.6–97.8)
MONOCYTES # BLD: 0.6 K/UL (ref 0.1–1.3)
MONOCYTES NFR BLD: 11 % (ref 4–12)
NEUTS SEG # BLD: 2.5 K/UL (ref 1.7–8.2)
NEUTS SEG NFR BLD: 46 % (ref 43–78)
NRBC # BLD: 0 K/UL (ref 0–0.2)
PLATELET # BLD AUTO: 235 K/UL (ref 150–450)
PMV BLD AUTO: 9.4 FL (ref 9.4–12.3)
POTASSIUM SERPL-SCNC: 3.7 MMOL/L (ref 3.5–5.1)
RBC # BLD AUTO: 4.54 M/UL (ref 4.23–5.6)
SODIUM SERPL-SCNC: 144 MMOL/L (ref 136–145)
TROPONIN I SERPL HS-MCNC: 50.3 PG/ML (ref 0–14)
WBC # BLD AUTO: 5.5 K/UL (ref 4.3–11.1)

## 2022-08-22 PROCEDURE — G0378 HOSPITAL OBSERVATION PER HR: HCPCS

## 2022-08-22 PROCEDURE — 85025 COMPLETE CBC W/AUTO DIFF WBC: CPT

## 2022-08-22 PROCEDURE — 80048 BASIC METABOLIC PNL TOTAL CA: CPT

## 2022-08-22 PROCEDURE — 36415 COLL VENOUS BLD VENIPUNCTURE: CPT

## 2022-08-22 PROCEDURE — 6360000002 HC RX W HCPCS: Performed by: FAMILY MEDICINE

## 2022-08-22 PROCEDURE — 6370000000 HC RX 637 (ALT 250 FOR IP): Performed by: FAMILY MEDICINE

## 2022-08-22 PROCEDURE — 84484 ASSAY OF TROPONIN QUANT: CPT

## 2022-08-22 PROCEDURE — 2580000003 HC RX 258: Performed by: FAMILY MEDICINE

## 2022-08-22 PROCEDURE — 96372 THER/PROPH/DIAG INJ SC/IM: CPT

## 2022-08-22 RX ADMIN — ARIPIPRAZOLE 10 MG: 10 TABLET ORAL at 08:54

## 2022-08-22 RX ADMIN — DULOXETINE HYDROCHLORIDE 60 MG: 60 CAPSULE, DELAYED RELEASE ORAL at 08:54

## 2022-08-22 RX ADMIN — ENOXAPARIN SODIUM 40 MG: 100 INJECTION SUBCUTANEOUS at 08:54

## 2022-08-22 RX ADMIN — PANTOPRAZOLE SODIUM 40 MG: 40 TABLET, DELAYED RELEASE ORAL at 05:17

## 2022-08-22 RX ADMIN — FINASTERIDE 5 MG: 5 TABLET, FILM COATED ORAL at 08:54

## 2022-08-22 RX ADMIN — TAMSULOSIN HYDROCHLORIDE 0.4 MG: 0.4 CAPSULE ORAL at 08:54

## 2022-08-22 RX ADMIN — SODIUM CHLORIDE, PRESERVATIVE FREE 10 ML: 5 INJECTION INTRAVENOUS at 08:56

## 2022-08-22 ASSESSMENT — PAIN SCALES - GENERAL: PAINLEVEL_OUTOF10: 0

## 2022-08-22 NOTE — PROGRESS NOTES
Patient is ready for discharge, education is complete. IV removed with catheter tip intact and dry dressing applied. Discharge summary explained.

## 2022-08-22 NOTE — H&P
Hospitalist History and Physical   Admit Date:  2022 12:21 PM   Name:  Cynthia Lou   Age:  46 y.o. Sex:  male  :  1970   MRN:  [id number]   Room:  ER02/02    Presenting Complaint: Irregular Heart Beat     Reason(s) for Admission: Palpitations [R00.2]     History of Present Illness:   Patient was discussed with the ER provider prior to seeing the patient. Cynthia Lou is a 46 y.o. male with medical history of depression and migraines, who presented with palpitations. He reports that this started after taking Kratom, which does have amphetamine like effects. Patient reports that he has taken this before, but did take more of it today. His drug screen was positive for amphetamines. It is unclear if this is the Kratom or some other agent. In the ER, troponins were done and were slightly going up. ER discussed his case with cardiology who did not feel that this was an acute cardiac event but recommended hospitalization to trend his troponins. We are asked to admit him to [facility]OS[facility] [address] and cardiology will see him in the morning. When I see the patient, he states he is feeling better. He is not having palpitations or chest pains. He has no other complaints at all. Review of Systems:  10 systems reviewed and negative except as noted in HPI.   Assessment & Plan:     Principal Problem:    Palpitations  Plan: Appears secondary to drug effect of Kratom      Dispo/Discharge Planning:   Observation, telemetry    Diet: Regular  VTE ppx:  Lovenox  Code status: Full    Hospital Problems             Last Modified POA    * (Principal) Palpitations 2022 Yes       Past History:  Past Medical History:   Diagnosis Date    Chronic pain     right shoulder, back, both hips     Depression     GERD (gastroesophageal reflux disease)     controlled with meds    Insomnia     Kidney stone     Migraine     Nicotine vapor product user      Past Surgical History:   Procedure Laterality Date HERNIA REPAIR Right 1978    KNEE ARTHROSCOPY Bilateral     ORTHOPEDIC SURGERY      artifical disc replacement at L5-S1    ORTHOPEDIC SURGERY      right hip impingement repair, microfracture, hip dysplasia    OTHER SURGICAL HISTORY Left     ankle    SHOULDER ARTHROSCOPY Right 7/22/16    RIGHT SHOULDER ARTHROSCOPY, DISTAL CLAVICLE EXCISION, DECOMPRESSION, BICEPS TENOTOMY    SHOULDER ARTHROSCOPY Left     SLAP repair - and then was out      Allergies   Allergen Reactions    Amoxicillin-Pot Clavulanate Diarrhea    Morphine Nausea And Vomiting      Social History     Tobacco Use    Smoking status: Never    Smokeless tobacco: Current     Last attempt to quit: 7/15/2014   Substance Use Topics    Alcohol use: No     Alcohol/week: 0.0 standard drinks      Family History   Problem Relation Age of Onset    Cancer Mother     Alzheimer's Disease Father     Psychiatric Disorder Father     Stroke Father     Cancer Father     Heart Disease Mother         ATRIAL FIB      Family history reviewed and negative except as noted above.     Immunization History   Administered Date(s) Administered    COVID-19, PFIZER PURPLE top, DILUTE for use, (age 15 y+), 30mcg/0.3mL 04/01/2021, 04/22/2021    Influenza Virus Vaccine 10/01/2018    Influenza, FLUCELVAX, (age 10 mo+), MDCK, PF 01/24/2018, 12/18/2018     Prior to Admit Medications:  Current Outpatient Medications   Medication Instructions    amitriptyline (ELAVIL) 25 MG tablet 1 TO 2 TABLETS BY MOUTH AS NEEDED AT BEDTIME    ARIPiprazole (ABILIFY) 10 MG tablet TAKE 1 TABLET BY MOUTH EVERY DAY    ARIPiprazole (ABILIFY) 5 MG tablet DAILY    DULoxetine (CYMBALTA) 60 MG extended release capsule TAKE ONE CAPSULE BY MOUTH EVERY DAY    finasteride (PROSCAR) 5 MG tablet TAKE 1 TABLET BY MOUTH EVERY DAY    lansoprazole (PREVACID) 30 MG delayed release capsule TAKE 1 CAPSULE BY MOUTH EVERY OTHER DAY    sildenafil (REVATIO) 20 MG tablet TAKE 1 TO 5 TABLETS BY MOUTH AS NEEDED **MAX 5 TABS PER DOSE/DAY** SUMAtriptan (IMITREX) 100 MG tablet Take one prn migraine. May repeat in two hours if needed. May take with two Advil Liquigels. tamsulosin (FLOMAX) 0.4 mg, Oral, DAILY    testosterone cypionate (DEPOTESTOTERONE CYPIONATE) 200 MG/ML injection 0.5 ML BY INTRAMUSCULAR ROUTE ONCE EVERY 2 WEEKS. MAX DAILY AMOUNT: 0.5 ML. Objective:   Patient Vitals for the past 24 hrs:   Temp Pulse Resp BP SpO2   08/21/22 2032 -- 81 -- 124/71 --   08/21/22 2002 -- 81 12 121/79 --   08/21/22 1932 -- -- -- (!) 131/90 --   08/21/22 1902 -- 77 21 124/78 --   08/21/22 1800 -- 78 14 134/84 98 %   08/21/22 1730 -- 74 20 116/74 98 %   08/21/22 1700 -- 64 23 118/77 97 %   08/21/22 1630 -- 75 23 114/76 96 %   08/21/22 1600 -- 74 12 121/77 98 %   08/21/22 1500 -- 79 15 118/79 97 %   08/21/22 1430 -- 77 14 128/81 97 %   08/21/22 1400 -- 86 16 124/80 99 %   08/21/22 1348 -- 91 17 127/82 98 %   08/21/22 1231 -- -- 18 -- 98 %   08/21/22 1222 99.1 °F (37.3 °C) (!) 109 16 (!) 140/80 97 %       Estimated body mass index is 22.81 kg/m² as calculated from the following:    Height as of this encounter: 5' 8\" (1.727 m). Weight as of this encounter: 150 lb (68 kg). Intake/Output Summary (Last 24 hours) at 8/21/2022 2043  Last data filed at 8/21/2022 1440  Gross per 24 hour   Intake 1000 ml   Output --   Net 1000 ml         Physical Exam:    Blood pressure 124/71, pulse 81, temperature 99.1 °F (37.3 °C), temperature source Oral, resp. rate 12, height 5' 8\" (1.727 m), weight 150 lb (68 kg), SpO2 98 %. General:    WD and WN, No apparent distress. Eyes:  PERRL; EOMI; sclera normal/non-icteric  HENT:  Normocephalic, without obvious abnormality; Oropharynx is clear with tacky mucous membranes   Neck:  No restricted ROM. Trachea midline   Resp:    Clear to auscultation bilaterally. Resp are even and unlabored  CVS/Heart: Regular rate and rhythm,  No LE edema    GI:    Soft, non-tender. Not distended.   Bowel sounds normal.     Musc/SK: Muscle strength is appropriate for age/condition; No cyanosis. No clubbing  Skin:  No rashes and normal coloration. Warm and dry.     Neurologic: CN II - XII are grossly intact - Eye exam as noted above; moves extremities equally  Psych: Alert and oriented x 4;  Judgement and insight are normal    I have reviewed ordered lab tests and independently visualized imaging below:    Last 24hr Labs:  Recent Results (from the past 24 hour(s))   EKG 12 Lead    Collection Time: 08/21/22 12:29 PM   Result Value Ref Range    Ventricular Rate 116 BPM    Atrial Rate 116 BPM    P-R Interval 116 ms    QRS Duration 94 ms    Q-T Interval 292 ms    QTc Calculation (Bazett) 405 ms    P Axis 63 degrees    R Axis 63 degrees    T Axis 28 degrees    Diagnosis !! AGE AND GENDER SPECIFIC ECG ANALYSIS !!    CBC    Collection Time: 08/21/22 12:33 PM   Result Value Ref Range    WBC 5.2 4.3 - 11.1 K/uL    RBC 4.89 4.23 - 5.6 M/uL    Hemoglobin 12.8 (L) 13.6 - 17.2 g/dL    Hematocrit 40.7 (L) 41.1 - 50.3 %    MCV 83.2 79.6 - 97.8 FL    MCH 26.2 26.1 - 32.9 PG    MCHC 31.4 31.4 - 35.0 g/dL    RDW 21.1 (H) 11.9 - 14.6 %    Platelets 646 825 - 544 K/uL    MPV 9.3 (L) 9.4 - 12.3 FL    nRBC 0.00 0.0 - 0.2 K/uL   Comprehensive Metabolic Panel    Collection Time: 08/21/22 12:33 PM   Result Value Ref Range    Sodium 142 136 - 145 mmol/L    Potassium 3.9 3.5 - 5.1 mmol/L    Chloride 118 (H) 98 - 107 mmol/L    CO2 18 (L) 21 - 32 mmol/L    Anion Gap 6 (L) 7 - 16 mmol/L    Glucose 108 (H) 65 - 100 mg/dL    BUN 8 6 - 23 MG/DL    Creatinine 1.13 0.8 - 1.5 MG/DL    GFR African American >60 >60 ml/min/1.73m2    GFR Non- >60 >60 ml/min/1.73m2    Calcium 8.1 (L) 8.3 - 10.4 MG/DL    Total Bilirubin 0.3 0.2 - 1.1 MG/DL    ALT 27 12 - 65 U/L    AST 25 15 - 37 U/L    Alk Phosphatase 95 50 - 136 U/L    Total Protein 6.8 6.3 - 8.2 g/dL    Albumin 3.4 (L) 3.5 - 5.0 g/dL    Globulin 3.4 2.3 - 3.5 g/dL    Albumin/Globulin Ratio 1.0 (L) 1.2 - 3.5     Magnesium Collection Time: 08/21/22 12:33 PM   Result Value Ref Range    Magnesium 2.0 1.8 - 2.4 mg/dL   Troponin    Collection Time: 08/21/22 12:33 PM   Result Value Ref Range    Troponin, High Sensitivity 16.8 (H) 0 - 14 pg/mL   Drug Screen Psych, Urine    Collection Time: 08/21/22 12:40 PM   Result Value Ref Range    Benzodiazepines, Urine Negative      Opiates, Urine Negative      Amphetamine, Urine Positive      Cocaine, Urine Negative      THC, TH-Cannabinol, Urine Negative     Troponin    Collection Time: 08/21/22  2:52 PM   Result Value Ref Range    Troponin, High Sensitivity 55.3 (H) 0 - 14 pg/mL   Troponin    Collection Time: 08/21/22  3:35 PM   Result Value Ref Range    Troponin, High Sensitivity 74.6 (H) 0 - 14 pg/mL   Troponin    Collection Time: 08/21/22  4:58 PM   Result Value Ref Range    Troponin, High Sensitivity 77.8 (H) 0 - 14 pg/mL   Troponin    Collection Time: 08/21/22  7:10 PM   Result Value Ref Range    Troponin, High Sensitivity 68.2 (H) 0 - 14 pg/mL         Other Studies:  No results found. No results found for this or any previous visit. Signed:  Vishnu Petit MD    Part of this note may have been written by using a voice dictation software. The note has been proof read but may still contain some grammatical/other typographical errors.

## 2022-08-22 NOTE — PLAN OF CARE
Problem: Discharge Planning  Goal: Discharge to home or other facility with appropriate resources  8/22/2022 1018 by Damari Lorenzana RN  Outcome: Progressing  8/21/2022 2218 by Navi Barraza RN  Outcome: Progressing     Problem: ABCDS Injury Assessment  Goal: Absence of physical injury  8/22/2022 1018 by Damari Lorenzana RN  Outcome: Progressing  8/21/2022 2218 by Navi Barraza RN  Outcome: Progressing

## 2022-08-22 NOTE — PROGRESS NOTES
TRANSFER - IN REPORT:    Verbal report received from Michael Lentz RN on Lyn Duty  being received from [facility] ED for routine progression of patient care      Report consisted of patient's Situation, Background, Assessment and   Recommendations(SBAR). Information from the following report(s) ED Encounter Summary, MAR, and Recent Results was reviewed with the receiving nurse. Opportunity for questions and clarification was provided. Assessment completed upon patient's arrival to unit and care assumed.

## 2022-08-22 NOTE — ED NOTES
TRANSFER - OUT REPORT:    Verbal report given to Thong Feliz RN on Madelin Barrera  being transferred to [facility] 07 for routine progression of patient care       Report consisted of patient's Situation, Background, Assessment and   Recommendations(SBAR). Information from the following report(s) ED Encounter Summary was reviewed with the receiving nurse. Lines:   Peripheral IV 08/21/22 Left Forearm (Active)   Site Assessment Clean, dry & intact 08/21/22 1230   Line Status Brisk blood return 08/21/22 1230   Phlebitis Assessment No symptoms 08/21/22 1230   Infiltration Assessment 0 08/21/22 1230   Alcohol Cap Used No 08/21/22 1230   Dressing Status Clean, dry & intact 08/21/22 1230   Dressing Type Transparent 08/21/22 1230        Opportunity for questions and clarification was provided.       Patient transported with:  Registered Nurse       John Hurst, [facility]  08/21/22 2448

## 2022-08-22 NOTE — DISCHARGE SUMMARY
Hospitalist Discharge Summary   Admit Date:  2022 12:21 PM   DC Note date: 2022  Name:  Fawn Castillo   Age:  46 y.o. Sex:  male  :  1970   MRN:  [id number]   Room:  [facility]  PCP:  DAMI Nielsen    Presenting Complaint: Irregular Heart Beat     Initial Admission Diagnosis: Palpitations [R00.2]  Substance abuse (HCC) [F19.10]  Elevated troponin [R77.8]     Problem List for this Hospitalization (present on admission):    Principal Problem:    Palpitations  Resolved Problems:    * No resolved hospital problems. *      Hospital Course:  Fawn Castillo is a 46 y.o. male with medical history of depression and migraines, who presented with palpitations. He reports that this started after taking Kratom, which does have amphetamine like effects. Patient reports that he has taken this before, but did take more of it today. His drug screen was positive for amphetamines. It is unclear if this is the Kratom or some other agent. In the ER, troponins were done and were slightly going up. ER discussed his case with cardiology who did not feel that this was an acute cardiac event but recommended hospitalization to trend his troponins which continued to trend downward and symptoms resolved. Patient was monitored overnight on tele with no acute events and discharged home to follow up with his primary care provider. Patient advised to avoid Kratom and other substances not recommended by his medical providers. Disposition: home  Diet: ADULT DIET; Regular  Code Status: Full Code    Follow Ups:   Follow-up Information     BALA Nielsen Schedule an appointment as soon as possible   for a visit in 1 week(s). Specialty: Physician Assistant  Contact information:  [address] [place] [address] [address] [facility]  [phone]                       Time spent in patient discharge and coordination 30 minutes.         Follow up labs/diagnostics (ultimately defer to outpatient provider):  Per primary care provider    Plan was discussed with patient and attending. All questions answered. Patient was stable at time of discharge. Instructions given to call a physician or return if any concerns. Current Discharge Medication List        CONTINUE these medications which have NOT CHANGED    Details   amitriptyline (ELAVIL) 25 MG tablet 1 TO 2 TABLETS BY MOUTH AS NEEDED AT BEDTIME      ARIPiprazole (ABILIFY) 10 MG tablet TAKE 1 TABLET BY MOUTH EVERY DAY      DULoxetine (CYMBALTA) 60 MG extended release capsule TAKE ONE CAPSULE BY MOUTH EVERY DAY      finasteride (PROSCAR) 5 MG tablet TAKE 1 TABLET BY MOUTH EVERY DAY      lansoprazole (PREVACID) 30 MG delayed release capsule TAKE 1 CAPSULE BY MOUTH EVERY OTHER DAY      sildenafil (REVATIO) 20 MG tablet TAKE 1 TO 5 TABLETS BY MOUTH AS NEEDED **MAX 5 TABS PER DOSE/DAY**      SUMAtriptan (IMITREX) 100 MG tablet Take one prn migraine. May repeat in two hours if needed. May take with two Advil Liquigels. tamsulosin (FLOMAX) 0.4 MG capsule Take 0.4 mg by mouth daily           STOP taking these medications       testosterone cypionate (DEPOTESTOTERONE CYPIONATE) 200 MG/ML injection Comments:   Reason for Stopping:               Procedures done this admission:  * No surgery found *    Consults this admission:  IP CONSULT TO CARDIOLOGY    Echocardiogram results:  No results found for this or any previous visit. Diagnostic Imaging/Tests:   No results found. No results for input(s): CULTURE in the last 720 hours.     Labs: Results:       BMP, Mg, Phos Recent Labs     08/21/22  1233 08/22/22  0501    144   K 3.9 3.7   * 117*   CO2 18* 25   ANIONGAP 6* 2*   BUN 8 10   CREATININE 1.13 0.87   LABGLOM >60 >60   GFRAA >60 >60   CALCIUM 8.1* 7.8*   GLUCOSE 108* 88   MG 2.0  --       CBC Recent Labs     08/21/22  1233 08/22/22  0501   WBC 5.2 5.5   RBC 4.89 4.54   HGB 12.8* 11.8*   HCT 40.7* 37.9*   MCV 83.2 83.5   MCH 26.2 26.0*   MCHC 31.4 31.1*   RDW 21.1* 20.9*    235   MPV 9.3* 9.4   NRBC 0.00 0.00   SEGS  --  46   LYMPHOPCT  --  39   EOSRELPCT  --  4   MONOPCT  --  11   BASOPCT  --  1   IMMGRAN  --  0   SEGSABS  --  2.5   LYMPHSABS  --  2.1   EOSABS  --  0.2   MONOSABS  --  0.6   BASOSABS  --  0.1   ABSIMMGRAN  --  0.0      LFT Recent Labs     08/21/22  1233   BILITOT 0.3   ALKPHOS 95   AST 25   ALT 27   PROT 6.8   LABALBU 3.4*   GLOB 3.4      Cardiac  Lab Results   Component Value Date/Time    TROPHS 50.3 08/22/2022 05:01 AM    TROPHS 68.2 08/21/2022 07:10 PM    TROPHS 77.8 08/21/2022 04:58 PM      Coags No results found for: PROTIME, INR, APTT   A1c No results found for: LABA1C, EAG   Lipids No results found for: CHOL, LDLCALC, LABVLDL, HDL, CHOLHDLRATIO, TRIG   Thyroid  No results found for: TSHELE, JGE2SCQ     Most Recent UA No results found for: COLORU, APPEARANCE, SPECGRAV, LABPH, PROTEINU, GLUCOSEU, KETUA, BILIRUBINUR, BLOODU, UROBILINOGEN, NITRU, LEUKOCYTESUR, WBCUA, RBCUA, EPITHUA, BACTERIA, LABCAST, MUCUS       All Labs from Last 24 Hrs:  Recent Results (from the past 24 hour(s))   EKG 12 Lead    Collection Time: 08/21/22 12:29 PM   Result Value Ref Range    Ventricular Rate 116 BPM    Atrial Rate 116 BPM    P-R Interval 116 ms    QRS Duration 94 ms    Q-T Interval 292 ms    QTc Calculation (Bazett) 405 ms    P Axis 63 degrees    R Axis 63 degrees    T Axis 28 degrees    Diagnosis !! AGE AND GENDER SPECIFIC ECG ANALYSIS !!    CBC    Collection Time: 08/21/22 12:33 PM   Result Value Ref Range    WBC 5.2 4.3 - 11.1 K/uL    RBC 4.89 4.23 - 5.6 M/uL    Hemoglobin 12.8 (L) 13.6 - 17.2 g/dL    Hematocrit 40.7 (L) 41.1 - 50.3 %    MCV 83.2 79.6 - 97.8 FL    MCH 26.2 26.1 - 32.9 PG    MCHC 31.4 31.4 - 35.0 g/dL    RDW 21.1 (H) 11.9 - 14.6 %    Platelets 607 941 - 823 K/uL    MPV 9.3 (L) 9.4 - 12.3 FL    nRBC 0.00 0.0 - 0.2 K/uL   Comprehensive Metabolic Panel    Collection Time: 08/21/22 12:33 PM   Result Value Ref Range Sodium 142 136 - 145 mmol/L    Potassium 3.9 3.5 - 5.1 mmol/L    Chloride 118 (H) 98 - 107 mmol/L    CO2 18 (L) 21 - 32 mmol/L    Anion Gap 6 (L) 7 - 16 mmol/L    Glucose 108 (H) 65 - 100 mg/dL    BUN 8 6 - 23 MG/DL    Creatinine 1.13 0.8 - 1.5 MG/DL    GFR African American >60 >60 ml/min/1.73m2    GFR Non- >60 >60 ml/min/1.73m2    Calcium 8.1 (L) 8.3 - 10.4 MG/DL    Total Bilirubin 0.3 0.2 - 1.1 MG/DL    ALT 27 12 - 65 U/L    AST 25 15 - 37 U/L    Alk Phosphatase 95 50 - 136 U/L    Total Protein 6.8 6.3 - 8.2 g/dL    Albumin 3.4 (L) 3.5 - 5.0 g/dL    Globulin 3.4 2.3 - 3.5 g/dL    Albumin/Globulin Ratio 1.0 (L) 1.2 - 3.5     Magnesium    Collection Time: 08/21/22 12:33 PM   Result Value Ref Range    Magnesium 2.0 1.8 - 2.4 mg/dL   Troponin    Collection Time: 08/21/22 12:33 PM   Result Value Ref Range    Troponin, High Sensitivity 16.8 (H) 0 - 14 pg/mL   Drug Screen Psych, Urine    Collection Time: 08/21/22 12:40 PM   Result Value Ref Range    Benzodiazepines, Urine Negative      Opiates, Urine Negative      Amphetamine, Urine Positive      Cocaine, Urine Negative      THC, TH-Cannabinol, Urine Negative     Troponin    Collection Time: 08/21/22  2:52 PM   Result Value Ref Range    Troponin, High Sensitivity 55.3 (H) 0 - 14 pg/mL   EKG 12 Lead    Collection Time: 08/21/22  3:31 PM   Result Value Ref Range    Ventricular Rate 77 BPM    Atrial Rate 77 BPM    P-R Interval 140 ms    QRS Duration 84 ms    Q-T Interval 358 ms    QTc Calculation (Bazett) 405 ms    P Axis 79 degrees    R Axis 58 degrees    T Axis 69 degrees    Diagnosis !! AGE AND GENDER SPECIFIC ECG ANALYSIS !!     Troponin    Collection Time: 08/21/22  3:35 PM   Result Value Ref Range    Troponin, High Sensitivity 74.6 (H) 0 - 14 pg/mL   Troponin    Collection Time: 08/21/22  4:58 PM   Result Value Ref Range    Troponin, High Sensitivity 77.8 (H) 0 - 14 pg/mL   Troponin    Collection Time: 08/21/22  7:10 PM   Result Value Ref Range Troponin, High Sensitivity 68.2 (H) 0 - 14 pg/mL   Drug Screen Psych, Urine    Collection Time: 08/21/22  8:47 PM   Result Value Ref Range    Benzodiazepines, Urine Negative      Opiates, Urine Negative      Amphetamine, Urine Negative      Cocaine, Urine Negative      THC, TH-Cannabinol, Urine Negative     Basic Metabolic Panel w/ Reflex to MG    Collection Time: 08/22/22  5:01 AM   Result Value Ref Range    Sodium 144 136 - 145 mmol/L    Potassium 3.7 3.5 - 5.1 mmol/L    Chloride 117 (H) 98 - 107 mmol/L    CO2 25 21 - 32 mmol/L    Anion Gap 2 (L) 7 - 16 mmol/L    Glucose 88 65 - 100 mg/dL    BUN 10 6 - 23 MG/DL    Creatinine 0.87 0.8 - 1.5 MG/DL    GFR African American >60 >60 ml/min/1.73m2    GFR Non- >60 >60 ml/min/1.73m2    Calcium 7.8 (L) 8.3 - 10.4 MG/DL   CBC with Auto Differential    Collection Time: 08/22/22  5:01 AM   Result Value Ref Range    WBC 5.5 4.3 - 11.1 K/uL    RBC 4.54 4.23 - 5.6 M/uL    Hemoglobin 11.8 (L) 13.6 - 17.2 g/dL    Hematocrit 37.9 (L) 41.1 - 50.3 %    MCV 83.5 79.6 - 97.8 FL    MCH 26.0 (L) 26.1 - 32.9 PG    MCHC 31.1 (L) 31.4 - 35.0 g/dL    RDW 20.9 (H) 11.9 - 14.6 %    Platelets 663 873 - 083 K/uL    MPV 9.4 9.4 - 12.3 FL    nRBC 0.00 0.0 - 0.2 K/uL    Differential Type AUTOMATED      Seg Neutrophils 46 43 - 78 %    Lymphocytes 39 13 - 44 %    Monocytes 11 4.0 - 12.0 %    Eosinophils % 4 0.5 - 7.8 %    Basophils 1 0.0 - 2.0 %    Immature Granulocytes 0 0.0 - 5.0 %    Segs Absolute 2.5 1.7 - 8.2 K/UL    Absolute Lymph # 2.1 0.5 - 4.6 K/UL    Absolute Mono # 0.6 0.1 - 1.3 K/UL    Absolute Eos # 0.2 0.0 - 0.8 K/UL    Basophils Absolute 0.1 0.0 - 0.2 K/UL    Absolute Immature Granulocyte 0.0 0.0 - 0.5 K/UL   Troponin    Collection Time: 08/22/22  5:01 AM   Result Value Ref Range    Troponin, High Sensitivity 50.3 (H) 0 - 14 pg/mL       Allergies   Allergen Reactions    Amoxicillin-Pot Clavulanate Diarrhea    Morphine Nausea And Vomiting     Immunization History Administered Date(s) Administered    COVID-19, PFIZER PURPLE top, DILUTE for use, (age 15 y+), 30mcg/0.3mL 04/01/2021, 04/22/2021    Influenza Virus Vaccine 10/01/2018    Influenza, FLUCELVAX, (age 10 mo+), MDCK, PF 01/24/2018, 12/18/2018       Recent Vital Data:  Patient Vitals for the past 24 hrs:   Temp Pulse Resp BP SpO2   08/22/22 0721 98.6 °F (37 °C) 84 17 133/77 100 %   08/22/22 0312 98.2 °F (36.8 °C) 69 18 133/85 99 %   08/22/22 0104 97.9 °F (36.6 °C) 67 18 133/83 97 %   08/21/22 2115 98.8 °F (37.1 °C) 78 16 (!) 141/87 100 %   08/21/22 2059 98.8 °F (37.1 °C) 88 -- (!) 110/58 97 %   08/21/22 2046 -- 90 -- -- 95 %   08/21/22 2032 -- 81 -- 124/71 96 %   08/21/22 2002 -- 81 12 121/79 95 %   08/21/22 1932 -- -- -- (!) 131/90 --   08/21/22 1902 -- 77 21 124/78 --   08/21/22 1800 -- 78 14 134/84 98 %   08/21/22 1730 -- 74 20 116/74 98 %   08/21/22 1700 -- 64 23 118/77 97 %   08/21/22 1630 -- 75 23 114/76 96 %   08/21/22 1600 -- 74 12 121/77 98 %   08/21/22 1500 -- 79 15 118/79 97 %   08/21/22 1430 -- 77 14 128/81 97 %   08/21/22 1400 -- 86 16 124/80 99 %   08/21/22 1348 -- 91 17 127/82 98 %   08/21/22 1231 -- -- 18 -- 98 %   08/21/22 1222 99.1 °F (37.3 °C) (!) 109 16 (!) 140/80 97 %       Oxygen Therapy  SpO2: 100 %  Pulse via Oximetry: 152 beats per minute  Pulse Oximeter Device Mode: Continuous  O2 Device: None (Room air)    Estimated body mass index is 22.81 kg/m² as calculated from the following:    Height as of this encounter: 5' 8\" (1.727 m). Weight as of this encounter: 150 lb (68 kg). Intake/Output Summary (Last 24 hours) at 8/22/2022 0948  Last data filed at 8/22/2022 0517  Gross per 24 hour   Intake 1300 ml   Output 400 ml   Net 900 ml         Physical Exam:  General:    Well nourished. No overt distress  Head:  Normocephalic, atraumatic  Eyes:  Sclerae appear normal.  Pupils equally round. HENT:  Nares appear normal, no drainage. Moist mucous membranes  Neck:  No restricted ROM.   Trachea midline  CV:   RRR. No m/r/g. No JVD  Lungs:   CTAB. No wheezing, rhonchi, or rales. Respirations even, unlabored  Abdomen:   Soft, nontender, nondistended. Extremities: Warm and dry. No cyanosis or clubbing. No edema. Skin:     No rashes. Normal coloration  Neuro:  CN II-XII grossly intact. Psych:  Normal mood and affect.     Signed:  DAMI Mclean

## 2022-08-22 NOTE — PLAN OF CARE
Problem: Discharge Planning  Goal: Discharge to home or other facility with appropriate resources  8/22/2022 1031 by Avery Schneider RN  Outcome: Adequate for Discharge  8/22/2022 1018 by Avery Schneider RN  Outcome: Progressing  8/21/2022 2218 by Logan Morales RN  Outcome: Progressing     Problem: ABCDS Injury Assessment  Goal: Absence of physical injury  8/22/2022 1031 by Avery Schneider RN  Outcome: Adequate for Discharge  8/22/2022 1018 by Avery Schneider RN  Outcome: Progressing  8/21/2022 2218 by Logan Morales RN  Outcome: Progressing

## 2022-09-06 NOTE — PROGRESS NOTES
Aryan Arias is a 46 y.o. male with medical history of depression and migraines, who presented with palpitations. He reports that this started after taking Kratom, which does have amphetamine like effects. Patient reports that he has taken this before, but did take more of it today. His drug screen was positive for amphetamines. It is unclear if this is the Kratom or some other agent. In the ER, troponins were done and were slightly going up. ER discussed his case with cardiology who did not feel that this was an acute cardiac event but recommended hospitalization to trend his troponins which continued to trend downward and symptoms resolved. Patient was monitored overnight on tele with no acute events and discharged home to follow up with his primary care provider. Patient advised to avoid Kratom and other substances not recommended by his medical providers. Testosterone stopped while in hospital.      Pt was consulted by cardiology while in hospital for elevated Troponin level.

## 2022-09-07 ENCOUNTER — OFFICE VISIT (OUTPATIENT)
Dept: FAMILY MEDICINE CLINIC | Facility: CLINIC | Age: 52
End: 2022-09-07
Payer: COMMERCIAL

## 2022-09-07 VITALS
OXYGEN SATURATION: 98 % | BODY MASS INDEX: 24.43 KG/M2 | RESPIRATION RATE: 14 BRPM | SYSTOLIC BLOOD PRESSURE: 112 MMHG | DIASTOLIC BLOOD PRESSURE: 78 MMHG | TEMPERATURE: 98.6 F | HEART RATE: 89 BPM | WEIGHT: 161.2 LBS | HEIGHT: 68 IN

## 2022-09-07 DIAGNOSIS — Z91.89 H/O DRUG OVERDOSE: ICD-10-CM

## 2022-09-07 DIAGNOSIS — R00.2 PALPITATIONS: Primary | ICD-10-CM

## 2022-09-07 DIAGNOSIS — Z23 FLU VACCINE NEED: ICD-10-CM

## 2022-09-07 DIAGNOSIS — S73.192S TEAR OF LEFT ACETABULAR LABRUM, SEQUELA: ICD-10-CM

## 2022-09-07 DIAGNOSIS — K21.9 GASTROESOPHAGEAL REFLUX DISEASE WITHOUT ESOPHAGITIS: ICD-10-CM

## 2022-09-07 DIAGNOSIS — G43.009 MIGRAINE WITHOUT AURA AND WITHOUT STATUS MIGRAINOSUS, NOT INTRACTABLE: ICD-10-CM

## 2022-09-07 DIAGNOSIS — Z12.11 ENCOUNTER FOR SCREENING COLONOSCOPY: ICD-10-CM

## 2022-09-07 PROCEDURE — 99214 OFFICE O/P EST MOD 30 MIN: CPT | Performed by: PHYSICIAN ASSISTANT

## 2022-09-07 PROCEDURE — 90471 IMMUNIZATION ADMIN: CPT | Performed by: PHYSICIAN ASSISTANT

## 2022-09-07 PROCEDURE — 90674 CCIIV4 VAC NO PRSV 0.5 ML IM: CPT | Performed by: PHYSICIAN ASSISTANT

## 2022-09-07 RX ORDER — CLONIDINE HYDROCHLORIDE 0.1 MG/1
0.1 TABLET ORAL NIGHTLY
COMMUNITY
Start: 2022-07-26

## 2022-09-07 RX ORDER — LANSOPRAZOLE 30 MG/1
CAPSULE, DELAYED RELEASE ORAL
Qty: 90 CAPSULE | Refills: 3 | Status: SHIPPED | OUTPATIENT
Start: 2022-09-07

## 2022-09-07 RX ORDER — SUMATRIPTAN 100 MG/1
TABLET, FILM COATED ORAL
Qty: 9 TABLET | Refills: 11 | Status: SHIPPED | OUTPATIENT
Start: 2022-09-07

## 2022-09-07 ASSESSMENT — PATIENT HEALTH QUESTIONNAIRE - PHQ9
1. LITTLE INTEREST OR PLEASURE IN DOING THINGS: 0
SUM OF ALL RESPONSES TO PHQ QUESTIONS 1-9: 0
SUM OF ALL RESPONSES TO PHQ QUESTIONS 1-9: 0
2. FEELING DOWN, DEPRESSED OR HOPELESS: 0
SUM OF ALL RESPONSES TO PHQ QUESTIONS 1-9: 0
SUM OF ALL RESPONSES TO PHQ QUESTIONS 1-9: 0
SUM OF ALL RESPONSES TO PHQ9 QUESTIONS 1 & 2: 0

## 2022-09-07 NOTE — PROGRESS NOTES
Post-Discharge Transitional Care  Follow Up      Leesa Vasquez   YOB: 1970    Date of Office Visit:  9/7/2022  Date of Hospital Admission: 8/21/22  Date of Hospital Discharge: 8/22/22  Risk of hospital readmission (high >=14%. Medium >=10%) :No data recorded    Care management risk score Rising risk (score 2-5) and Complex Care (Scores >=6): No Risk Score On File     Non face to face  following discharge, date last encounter closed (first attempt may have been earlier): *No documented post hospital discharge outreach found in the last 14 days    Call initiated 2 business days of discharge: *No response recorded in the last 14 days    ASSESSMENT/PLAN:   Palpitations  H/O drug overdose  Comments:  Kratom 8/21/22  Migraine without aura and without status migrainosus, not intractable  -     SUMAtriptan (IMITREX) 100 MG tablet; Take one prn migraine. May repeat in two hours if needed. May take with two Advil Liquigels. , Disp-9 tablet, R-11Normal  Gastroesophageal reflux disease without esophagitis  -     lansoprazole (PREVACID) 30 MG delayed release capsule; TAKE 1 CAPSULE BY MOUTH EVERY OTHER DAY. May take add'l dose if needed for breathrough symptoms. , Disp-90 capsule, R-3Normal  Tear of left acetabular labrum, sequela  Encounter for screening colonoscopy  -     [facility]  Flu vaccine need  -     Influenza, FLUCELVAX, (age 10 mo+), IM, PF, 0.5 mL  -     [facility]    Medical Decision Making: low complexity  Return for F/u as needed. Subjective:   HPI:  Follow up of Hospital problems/diagnosis(es): Drug (Kratom) Overdose, palpitations. Inpatient course: Discharge summary reviewed- see chart. Interval history/Current status: Symptoms resolved.       Patient Active Problem List   Diagnosis    Malignant neoplasm of descended right testis [facility])    Multiple joint complaints    Pain in right hip    Benign prostatic hyperplasia (BPH) with straining on urination    Complex tear of lateral meniscus of right knee as current injury    Depression    Tear of acetabular labrum    Tubulovillous adenoma polyp of colon    Gastroesophageal reflux disease without esophagitis    Hiatal hernia    Femoroacetabular impingement    Nicotine vapor product user    Cyclic citrullinated peptide (CCP) antibody positive       Medications listed as ordered at the time of discharge from hospital     Medication List            Accurate as of September 7, 2022  9:07 AM. If you have any questions, ask your nurse or doctor. CHANGE how you take these medications      lansoprazole 30 MG delayed release capsule  Commonly known as: PREVACID  TAKE 1 CAPSULE BY MOUTH EVERY OTHER DAY. May take add'l dose if needed for breathrough symptoms. What changed: additional instructions  Changed by: DAMI Lawrence            CONTINUE taking these medications      amitriptyline 25 MG tablet  Commonly known as: ELAVIL     ARIPiprazole 10 MG tablet  Commonly known as: ABILIFY     cloNIDine 0.1 MG tablet  Commonly known as: CATAPRES     DULoxetine 60 MG extended release capsule  Commonly known as: CYMBALTA     finasteride 5 MG tablet  Commonly known as: PROSCAR     sildenafil 20 MG tablet  Commonly known as: REVATIO     SUMAtriptan 100 MG tablet  Commonly known as: IMITREX  Take one prn migraine. May repeat in two hours if needed. May take with two Advil Liquigels.      tamsulosin 0.4 MG capsule  Commonly known as: FLOMAX               Where to Get Your Medications        These medications were sent to [id number]- [place], [place] - [address] [facility], [address] [place] [address] [facility] [id number]      Phone: [phone]   lansoprazole 30 MG delayed release capsule  SUMAtriptan 100 MG tablet           Medications marked \"taking\" at this time  Outpatient Medications Marked as Taking for the 9/7/22 encounter (Office Visit) with DAMI Jurado   Medication Sig Dispense Refill    SUMAtriptan (IMITREX) 100 MG tablet Take one prn migraine. May repeat in two hours if needed. May take with two Advil Liquigels. 9 tablet 11    lansoprazole (PREVACID) 30 MG delayed release capsule TAKE 1 CAPSULE BY MOUTH EVERY OTHER DAY. May take add'l dose if needed for breathrough symptoms. 90 capsule 3    amitriptyline (ELAVIL) 25 MG tablet 1 TO 2 TABLETS BY MOUTH AS NEEDED AT BEDTIME      ARIPiprazole (ABILIFY) 10 MG tablet TAKE 1 TABLET BY MOUTH EVERY DAY      DULoxetine (CYMBALTA) 60 MG extended release capsule TAKE ONE CAPSULE BY MOUTH EVERY DAY      finasteride (PROSCAR) 5 MG tablet TAKE 1 TABLET BY MOUTH EVERY DAY      sildenafil (REVATIO) 20 MG tablet TAKE 1 TO 5 TABLETS BY MOUTH AS NEEDED **MAX 5 TABS PER DOSE/DAY**      tamsulosin (FLOMAX) 0.4 MG capsule Take 0.4 mg by mouth daily        PHQ-9  9/7/2022   Little interest or pleasure in doing things 0   Feeling down, depressed, or hopeless 0   PHQ-2 Score 0   PHQ-9 Total Score 0        Medications patient taking as of now reconciled against medications ordered at time of hospital discharge: Yes    A comprehensive review of systems was negative except for what was noted in the HPI. Objective:    /78   Pulse 89   Temp 98.6 °F (37 °C) (Oral)   Resp 14   Ht 5' 8\" (1.727 m)   Wt 161 lb 3.2 oz (73.1 kg)   SpO2 98%   BMI 24.51 kg/m²       Eduardo Painter is a 46 y.o. male with medical history of depression and migraines, who presented with palpitations. He reports that this started after taking Kratom, which does have amphetamine like effects. Patient reports that he has taken this before, but did take more of it today. His drug screen was positive for amphetamines. It is unclear if this is the Kratom or some other agent. In the ER, troponins were done and were slightly going up.   ER discussed his case with cardiology who did not feel that this was an acute cardiac event but recommended hospitalization to trend his troponins which continued to trend downward and symptoms resolved. Patient was monitored overnight on tele with no acute events and discharged home to follow up with his primary care provider. Patient advised to avoid Kratom and other substances not recommended by his medical providers. Pt states that he saw Dr. Earline villareal--needs sx on left hip surgical (labral repair from prior wakeboarding accident). Currently exercising and has taken NSAIDs off/on. He does not feel that he is needing anything for pain in his hip at this time. Testosterone stopped while in hospital.  --followed by Urology annually for this. He has a h/o testicular CA and had his testicle removed. No known CA recurrence. Pt was consulted by cardiology while in hospital for elevated Troponin level. He is no longer experiencing palpitations since being discharged from the Hospital.      He is followed by Psychiatry at [facility] for depression. States that his UDS while hospitalized also tested positive for Amphetamines, and he believes this would have been caused by Abilify. He will discuss this w/ his psychiatrist, to see if he can be switched to a different medication. Migraines--occur infrequently. Last one was ~3 mos ago. Provided a refill of Imitrex to take prn. Refilled PPI which he is taking QOD for GERD symptoms. Flu shot administered today. On this date, I spent 30 minutes reviewing previous notes, test results and face to face with the patient discussing the diagnosis and importance of compliance with the treatment plan as well as documenting on the day of the visit. An electronic signature was used to authenticate this note.   --Kranthi Baires MA

## 2023-02-22 DIAGNOSIS — K21.9 GASTROESOPHAGEAL REFLUX DISEASE WITHOUT ESOPHAGITIS: ICD-10-CM

## 2023-02-22 RX ORDER — LANSOPRAZOLE 30 MG/1
CAPSULE, DELAYED RELEASE ORAL
Qty: 45 CAPSULE | Refills: 3 | Status: SHIPPED | OUTPATIENT
Start: 2023-02-22

## 2023-07-27 DIAGNOSIS — N40.1 BENIGN PROSTATIC HYPERPLASIA WITH LOWER URINARY TRACT SYMPTOMS: ICD-10-CM

## 2023-07-27 RX ORDER — TAMSULOSIN HYDROCHLORIDE 0.4 MG/1
CAPSULE ORAL
Qty: 90 CAPSULE | Refills: 3 | Status: SHIPPED | OUTPATIENT
Start: 2023-07-27

## 2023-10-05 DIAGNOSIS — G43.009 MIGRAINE WITHOUT AURA AND WITHOUT STATUS MIGRAINOSUS, NOT INTRACTABLE: ICD-10-CM

## 2023-10-05 RX ORDER — SUMATRIPTAN 100 MG/1
TABLET, FILM COATED ORAL
Qty: 9 TABLET | Refills: 11 | OUTPATIENT
Start: 2023-10-05

## 2024-01-10 PROBLEM — Z85.47 HX OF TESTICULAR CANCER: Status: ACTIVE | Noted: 2024-01-10

## 2024-01-10 PROBLEM — C62.11 MALIGNANT NEOPLASM OF DESCENDED RIGHT TESTIS (HCC): Status: RESOLVED | Noted: 2019-08-26 | Resolved: 2024-01-10

## 2024-01-11 ENCOUNTER — OFFICE VISIT (OUTPATIENT)
Dept: FAMILY MEDICINE CLINIC | Facility: CLINIC | Age: 54
End: 2024-01-11
Payer: COMMERCIAL

## 2024-01-11 VITALS
HEIGHT: 67 IN | HEART RATE: 69 BPM | RESPIRATION RATE: 14 BRPM | BODY MASS INDEX: 25.85 KG/M2 | TEMPERATURE: 97.8 F | SYSTOLIC BLOOD PRESSURE: 130 MMHG | WEIGHT: 164.7 LBS | DIASTOLIC BLOOD PRESSURE: 84 MMHG | OXYGEN SATURATION: 98 %

## 2024-01-11 DIAGNOSIS — Z00.00 PHYSICAL EXAM, ANNUAL: Primary | ICD-10-CM

## 2024-01-11 DIAGNOSIS — Z85.47 HX OF TESTICULAR CANCER: ICD-10-CM

## 2024-01-11 DIAGNOSIS — Z12.12 SCREENING FOR COLORECTAL CANCER: ICD-10-CM

## 2024-01-11 DIAGNOSIS — N52.9 ERECTILE DYSFUNCTION, UNSPECIFIED ERECTILE DYSFUNCTION TYPE: ICD-10-CM

## 2024-01-11 DIAGNOSIS — B35.1 ONYCHOMYCOSIS: ICD-10-CM

## 2024-01-11 DIAGNOSIS — K21.9 GASTROESOPHAGEAL REFLUX DISEASE WITHOUT ESOPHAGITIS: ICD-10-CM

## 2024-01-11 DIAGNOSIS — Z12.11 SCREENING FOR COLORECTAL CANCER: ICD-10-CM

## 2024-01-11 DIAGNOSIS — G43.009 MIGRAINE WITHOUT AURA AND WITHOUT STATUS MIGRAINOSUS, NOT INTRACTABLE: ICD-10-CM

## 2024-01-11 LAB
ALBUMIN SERPL-MCNC: 4.6 G/DL (ref 3.5–5)
ALBUMIN/GLOB SERPL: 1.8 (ref 0.4–1.6)
ALP SERPL-CCNC: 106 U/L (ref 50–136)
ALT SERPL-CCNC: 104 U/L (ref 12–65)
ANION GAP SERPL CALC-SCNC: 3 MMOL/L (ref 2–11)
AST SERPL-CCNC: 42 U/L (ref 15–37)
BASOPHILS # BLD: 0.1 K/UL (ref 0–0.2)
BASOPHILS NFR BLD: 1 % (ref 0–2)
BILIRUB SERPL-MCNC: 0.3 MG/DL (ref 0.2–1.1)
BUN SERPL-MCNC: 16 MG/DL (ref 6–23)
CALCIUM SERPL-MCNC: 9.6 MG/DL (ref 8.3–10.4)
CHLORIDE SERPL-SCNC: 107 MMOL/L (ref 103–113)
CHOLEST SERPL-MCNC: 185 MG/DL
CO2 SERPL-SCNC: 28 MMOL/L (ref 21–32)
CREAT SERPL-MCNC: 1 MG/DL (ref 0.8–1.5)
DIFFERENTIAL METHOD BLD: ABNORMAL
EOSINOPHIL # BLD: 0.1 K/UL (ref 0–0.8)
EOSINOPHIL NFR BLD: 1 % (ref 0.5–7.8)
ERYTHROCYTE [DISTWIDTH] IN BLOOD BY AUTOMATED COUNT: 14.8 % (ref 11.9–14.6)
GLOBULIN SER CALC-MCNC: 2.6 G/DL (ref 2.8–4.5)
GLUCOSE SERPL-MCNC: 82 MG/DL (ref 65–100)
HCT VFR BLD AUTO: 40.6 % (ref 41.1–50.3)
HDLC SERPL-MCNC: 50 MG/DL (ref 40–60)
HDLC SERPL: 3.7
HGB BLD-MCNC: 12.7 G/DL (ref 13.6–17.2)
IMM GRANULOCYTES # BLD AUTO: 0 K/UL (ref 0–0.5)
IMM GRANULOCYTES NFR BLD AUTO: 0 % (ref 0–5)
LDLC SERPL CALC-MCNC: 105.2 MG/DL
LYMPHOCYTES # BLD: 2.2 K/UL (ref 0.5–4.6)
LYMPHOCYTES NFR BLD: 30 % (ref 13–44)
MCH RBC QN AUTO: 27.6 PG (ref 26.1–32.9)
MCHC RBC AUTO-ENTMCNC: 31.3 G/DL (ref 31.4–35)
MCV RBC AUTO: 88.3 FL (ref 82–102)
MONOCYTES # BLD: 0.6 K/UL (ref 0.1–1.3)
MONOCYTES NFR BLD: 9 % (ref 4–12)
NEUTS SEG # BLD: 4.5 K/UL (ref 1.7–8.2)
NEUTS SEG NFR BLD: 59 % (ref 43–78)
NRBC # BLD: 0 K/UL (ref 0–0.2)
PLATELET # BLD AUTO: 402 K/UL (ref 150–450)
PMV BLD AUTO: 9.8 FL (ref 9.4–12.3)
POTASSIUM SERPL-SCNC: 5.1 MMOL/L (ref 3.5–5.1)
PROT SERPL-MCNC: 7.2 G/DL (ref 6.3–8.2)
RBC # BLD AUTO: 4.6 M/UL (ref 4.23–5.6)
SODIUM SERPL-SCNC: 138 MMOL/L (ref 136–146)
TRIGL SERPL-MCNC: 149 MG/DL (ref 35–150)
TSH, 3RD GENERATION: 0.81 UIU/ML (ref 0.36–3.74)
VLDLC SERPL CALC-MCNC: 29.8 MG/DL (ref 6–23)
WBC # BLD AUTO: 7.5 K/UL (ref 4.3–11.1)

## 2024-01-11 PROCEDURE — 99396 PREV VISIT EST AGE 40-64: CPT | Performed by: PHYSICIAN ASSISTANT

## 2024-01-11 PROCEDURE — 99213 OFFICE O/P EST LOW 20 MIN: CPT | Performed by: PHYSICIAN ASSISTANT

## 2024-01-11 RX ORDER — SILDENAFIL 100 MG/1
100 TABLET, FILM COATED ORAL DAILY PRN
Qty: 30 TABLET | Refills: 1 | Status: SHIPPED | OUTPATIENT
Start: 2024-01-11

## 2024-01-11 RX ORDER — ZOSTER VACCINE RECOMBINANT, ADJUVANTED 50 MCG/0.5
0.5 KIT INTRAMUSCULAR SEE ADMIN INSTRUCTIONS
Qty: 0.5 ML | Refills: 0 | Status: SHIPPED | OUTPATIENT
Start: 2024-01-11 | End: 2024-07-09

## 2024-01-11 RX ORDER — BACLOFEN 10 MG/1
10 TABLET ORAL
COMMUNITY
Start: 2024-01-04

## 2024-01-11 RX ORDER — SUMATRIPTAN 100 MG/1
TABLET, FILM COATED ORAL
Qty: 27 TABLET | Refills: 3 | Status: SHIPPED | OUTPATIENT
Start: 2024-01-11

## 2024-01-11 RX ORDER — LANSOPRAZOLE 30 MG/1
CAPSULE, DELAYED RELEASE ORAL
Qty: 45 CAPSULE | Refills: 3 | Status: SHIPPED | OUTPATIENT
Start: 2024-01-11

## 2024-01-11 SDOH — ECONOMIC STABILITY: HOUSING INSECURITY
IN THE LAST 12 MONTHS, WAS THERE A TIME WHEN YOU DID NOT HAVE A STEADY PLACE TO SLEEP OR SLEPT IN A SHELTER (INCLUDING NOW)?: NO

## 2024-01-11 SDOH — ECONOMIC STABILITY: INCOME INSECURITY: HOW HARD IS IT FOR YOU TO PAY FOR THE VERY BASICS LIKE FOOD, HOUSING, MEDICAL CARE, AND HEATING?: NOT VERY HARD

## 2024-01-11 SDOH — ECONOMIC STABILITY: FOOD INSECURITY: WITHIN THE PAST 12 MONTHS, YOU WORRIED THAT YOUR FOOD WOULD RUN OUT BEFORE YOU GOT MONEY TO BUY MORE.: NEVER TRUE

## 2024-01-11 SDOH — ECONOMIC STABILITY: FOOD INSECURITY: WITHIN THE PAST 12 MONTHS, THE FOOD YOU BOUGHT JUST DIDN'T LAST AND YOU DIDN'T HAVE MONEY TO GET MORE.: NEVER TRUE

## 2024-01-11 ASSESSMENT — ENCOUNTER SYMPTOMS
SORE THROAT: 0
COUGH: 0
CHEST TIGHTNESS: 0
EYE DISCHARGE: 0
ABDOMINAL PAIN: 0
WHEEZING: 0
EYE ITCHING: 0
DIARRHEA: 0
RHINORRHEA: 0
CONSTIPATION: 0
EYE PAIN: 0

## 2024-01-11 ASSESSMENT — PATIENT HEALTH QUESTIONNAIRE - PHQ9
9. THOUGHTS THAT YOU WOULD BE BETTER OFF DEAD, OR OF HURTING YOURSELF: 0
SUM OF ALL RESPONSES TO PHQ QUESTIONS 1-9: 0
SUM OF ALL RESPONSES TO PHQ9 QUESTIONS 1 & 2: 0
5. POOR APPETITE OR OVEREATING: 0
SUM OF ALL RESPONSES TO PHQ QUESTIONS 1-9: 0
8. MOVING OR SPEAKING SO SLOWLY THAT OTHER PEOPLE COULD HAVE NOTICED. OR THE OPPOSITE, BEING SO FIGETY OR RESTLESS THAT YOU HAVE BEEN MOVING AROUND A LOT MORE THAN USUAL: 0
SUM OF ALL RESPONSES TO PHQ QUESTIONS 1-9: 0
SUM OF ALL RESPONSES TO PHQ QUESTIONS 1-9: 0
6. FEELING BAD ABOUT YOURSELF - OR THAT YOU ARE A FAILURE OR HAVE LET YOURSELF OR YOUR FAMILY DOWN: 0
3. TROUBLE FALLING OR STAYING ASLEEP: 0
4. FEELING TIRED OR HAVING LITTLE ENERGY: 0
1. LITTLE INTEREST OR PLEASURE IN DOING THINGS: 0
2. FEELING DOWN, DEPRESSED OR HOPELESS: 0
7. TROUBLE CONCENTRATING ON THINGS, SUCH AS READING THE NEWSPAPER OR WATCHING TELEVISION: 0

## 2024-01-11 NOTE — PROGRESS NOTES
Negative for agitation and dysphoric mood. The patient is not nervous/anxious.         Vitals:    01/11/24 1425   BP: 130/84   Pulse: 69   Resp: 14   Temp: 97.8 °F (36.6 °C)   TempSrc: Oral   SpO2: 98%   Weight: 74.7 kg (164 lb 11.2 oz)   Height: 1.702 m (5' 7\")           PHYSICAL EXAM  Physical Exam  Constitutional:       Appearance: Normal appearance. He is normal weight.   HENT:      Head: Normocephalic.      Right Ear: Tympanic membrane, ear canal and external ear normal.      Left Ear: Tympanic membrane, ear canal and external ear normal.      Nose: Nose normal.      Mouth/Throat:      Mouth: Mucous membranes are moist.   Eyes:      Extraocular Movements: Extraocular movements intact.      Conjunctiva/sclera: Conjunctivae normal.      Pupils: Pupils are equal, round, and reactive to light.   Cardiovascular:      Rate and Rhythm: Normal rate and regular rhythm.      Heart sounds: Normal heart sounds.   Pulmonary:      Effort: Pulmonary effort is normal.      Breath sounds: Normal breath sounds. No wheezing or rhonchi.   Abdominal:      General: Abdomen is flat.      Palpations: There is no mass.      Tenderness: There is no abdominal tenderness.      Hernia: No hernia is present.   Musculoskeletal:      Cervical back: Normal range of motion and neck supple.   Skin:     General: Skin is warm and dry.      Comments: Thickened, yellowed, dystrophic toenails on both feet.  Peeling and cracked skin on plantar surface of both feet.   Neurological:      General: No focal deficit present.      Mental Status: He is alert and oriented to person, place, and time.   Psychiatric:         Mood and Affect: Mood normal.         Behavior: Behavior normal.         Thought Content: Thought content normal.         Judgment: Judgment normal.              1/11/2024     2:33 PM 9/7/2022     8:18 AM   PHQ-9    Little interest or pleasure in doing things 0 0   Feeling down, depressed, or hopeless 0 0   Trouble falling or staying asleep,

## 2024-01-17 DIAGNOSIS — R79.89 ELEVATED LFTS: Primary | ICD-10-CM

## 2024-01-18 DIAGNOSIS — D64.9 ANEMIA, UNSPECIFIED TYPE: Primary | ICD-10-CM

## 2024-02-14 ENCOUNTER — OFFICE VISIT (OUTPATIENT)
Dept: UROLOGY | Age: 54
End: 2024-02-14
Payer: COMMERCIAL

## 2024-02-14 DIAGNOSIS — N40.1 BENIGN PROSTATIC HYPERPLASIA WITH LOWER URINARY TRACT SYMPTOMS, SYMPTOM DETAILS UNSPECIFIED: ICD-10-CM

## 2024-02-14 DIAGNOSIS — C62.01 MALIGNANT NEOPLASM OF UNDESCENDED RIGHT TESTIS (HCC): Primary | ICD-10-CM

## 2024-02-14 DIAGNOSIS — E29.1 TESTICULAR HYPOFUNCTION: ICD-10-CM

## 2024-02-14 DIAGNOSIS — C62.01 MALIGNANT NEOPLASM OF UNDESCENDED RIGHT TESTIS (HCC): ICD-10-CM

## 2024-02-14 DIAGNOSIS — N52.01 ERECTILE DYSFUNCTION DUE TO ARTERIAL INSUFFICIENCY: ICD-10-CM

## 2024-02-14 LAB
ALBUMIN SERPL-MCNC: 4.5 G/DL (ref 3.5–5)
ALBUMIN/GLOB SERPL: 1.6 (ref 0.4–1.6)
ALP SERPL-CCNC: 106 U/L (ref 50–136)
ALT SERPL-CCNC: 38 U/L (ref 12–65)
AST SERPL-CCNC: 22 U/L (ref 15–37)
BILIRUB DIRECT SERPL-MCNC: <0.1 MG/DL
BILIRUB SERPL-MCNC: 0.3 MG/DL (ref 0.2–1.1)
BILIRUBIN, URINE, POC: NEGATIVE
BLOOD URINE, POC: NEGATIVE
ERYTHROCYTE [DISTWIDTH] IN BLOOD BY AUTOMATED COUNT: 14.3 % (ref 11.9–14.6)
GLOBULIN SER CALC-MCNC: 2.9 G/DL (ref 2.8–4.5)
GLUCOSE URINE, POC: NEGATIVE
HCG SERPL-ACNC: 2 MIU/ML (ref 0–2)
HCT VFR BLD AUTO: 42.4 % (ref 41.1–50.3)
HGB BLD-MCNC: 13.1 G/DL (ref 13.6–17.2)
KETONES, URINE, POC: NEGATIVE
LDH SERPL L TO P-CCNC: 271 U/L (ref 100–190)
LEUKOCYTE ESTERASE, URINE, POC: NEGATIVE
MCH RBC QN AUTO: 27.1 PG (ref 26.1–32.9)
MCHC RBC AUTO-ENTMCNC: 30.9 G/DL (ref 31.4–35)
MCV RBC AUTO: 87.6 FL (ref 82–102)
NITRITE, URINE, POC: NEGATIVE
NRBC # BLD: 0 K/UL (ref 0–0.2)
PH, URINE, POC: 6.5 (ref 4.6–8)
PLATELET # BLD AUTO: 392 K/UL (ref 150–450)
PMV BLD AUTO: 10.2 FL (ref 9.4–12.3)
PROT SERPL-MCNC: 7.4 G/DL (ref 6.3–8.2)
PROTEIN,URINE, POC: NEGATIVE
PSA SERPL-MCNC: 1.1 NG/ML
PVR, POC: 341 CC
RBC # BLD AUTO: 4.84 M/UL (ref 4.23–5.6)
SPECIFIC GRAVITY, URINE, POC: 1.01 (ref 1–1.03)
URINALYSIS CLARITY, POC: NORMAL
URINALYSIS COLOR, POC: NORMAL
UROBILINOGEN, POC: NORMAL
WBC # BLD AUTO: 7.4 K/UL (ref 4.3–11.1)

## 2024-02-14 PROCEDURE — 99215 OFFICE O/P EST HI 40 MIN: CPT | Performed by: UROLOGY

## 2024-02-14 PROCEDURE — 51798 US URINE CAPACITY MEASURE: CPT | Performed by: UROLOGY

## 2024-02-14 PROCEDURE — 81003 URINALYSIS AUTO W/O SCOPE: CPT | Performed by: UROLOGY

## 2024-02-15 LAB — AFP-TM SERPL-MCNC: 3 NG/ML

## 2024-02-15 RX ORDER — TESTOSTERONE CYPIONATE 1000 MG/10ML
100 INJECTION, SOLUTION INTRAMUSCULAR
Qty: 9 ML | Refills: 3 | Status: SHIPPED | OUTPATIENT
Start: 2024-02-15 | End: 2025-02-14

## 2024-02-15 NOTE — RESULT ENCOUNTER NOTE
Mr. Miles,     Your tumor markers returned normal with the exception of 1, the LDH which is not a specific marker.  I would like to see the CT scan results before making further recommendations on follow up.  I will reach out to you again once I get those ct results back     Please let me know if you have any questions/concerns.    Best regards,  Dr. Porras

## 2024-02-16 LAB — TESTOST SERPL-MCNC: 290 NG/DL (ref 264–916)

## 2024-02-17 NOTE — RESULT ENCOUNTER NOTE
Mr. Miles, your testosterone lab did return low at 290.  I would recommend you start the replacement and I will reach out to you with your CT results once I get those.  The CT results will determine our follow up plan. Please get that done at your convenience     Best Regards,  Dr. Porras

## 2024-02-23 LAB — TESTOST FREE SERPL-MCNC: 4 PG/ML (ref 7.2–24)

## 2024-02-28 ENCOUNTER — TELEPHONE (OUTPATIENT)
Dept: GASTROENTEROLOGY | Age: 54
End: 2024-02-28

## 2024-03-08 ENCOUNTER — NURSE ONLY (OUTPATIENT)
Dept: FAMILY MEDICINE CLINIC | Facility: CLINIC | Age: 54
End: 2024-03-08

## 2024-03-08 DIAGNOSIS — R79.89 ELEVATED LFTS: ICD-10-CM

## 2024-03-08 DIAGNOSIS — D64.9 ANEMIA, UNSPECIFIED TYPE: ICD-10-CM

## 2024-03-08 LAB
ALBUMIN SERPL-MCNC: 4.3 G/DL (ref 3.5–5)
ALBUMIN/GLOB SERPL: 1.6 (ref 0.4–1.6)
ALP SERPL-CCNC: 128 U/L (ref 50–136)
ALT SERPL-CCNC: 39 U/L (ref 12–65)
AST SERPL-CCNC: 27 U/L (ref 15–37)
BILIRUB DIRECT SERPL-MCNC: <0.1 MG/DL
BILIRUB SERPL-MCNC: 0.3 MG/DL (ref 0.2–1.1)
FERRITIN SERPL-MCNC: 8 NG/ML (ref 8–388)
GLOBULIN SER CALC-MCNC: 2.7 G/DL (ref 2.8–4.5)
PROT SERPL-MCNC: 7 G/DL (ref 6.3–8.2)

## 2024-03-11 DIAGNOSIS — D64.9 ANEMIA, UNSPECIFIED TYPE: Primary | ICD-10-CM

## 2024-03-11 DIAGNOSIS — B35.1 ONYCHOMYCOSIS: Primary | ICD-10-CM

## 2024-03-11 RX ORDER — TERBINAFINE HYDROCHLORIDE 250 MG/1
250 TABLET ORAL DAILY
Qty: 120 TABLET | Refills: 0 | Status: SHIPPED | OUTPATIENT
Start: 2024-03-11 | End: 2024-07-09

## 2024-03-14 ENCOUNTER — TELEPHONE (OUTPATIENT)
Dept: GASTROENTEROLOGY | Age: 54
End: 2024-03-14

## 2024-03-14 DIAGNOSIS — E29.1 TESTICULAR HYPOFUNCTION: Primary | ICD-10-CM

## 2024-03-14 RX ORDER — NEEDLES, SAFETY 25GX1 1/2"
NEEDLE, DISPOSABLE MISCELLANEOUS
Qty: 50 EACH | Refills: 3 | Status: SHIPPED | OUTPATIENT
Start: 2024-03-14

## 2024-03-14 NOTE — TELEPHONE ENCOUNTER
Patient called to schedule colonoscopy. My chart message sent to Dr Vallejo to ask if patient may be direct schedule. Patient verbalized he is asymptomatic.

## 2024-03-15 ENCOUNTER — TELEPHONE (OUTPATIENT)
Dept: GASTROENTEROLOGY | Age: 54
End: 2024-03-15

## 2024-03-15 NOTE — TELEPHONE ENCOUNTER
Called patient to let him know that per Dr Vallejo he is ok for direct schedule for EGD/ colonoscopy(anemia).   / xavier

## 2024-03-18 ENCOUNTER — PREP FOR PROCEDURE (OUTPATIENT)
Dept: GASTROENTEROLOGY | Age: 54
End: 2024-03-18

## 2024-03-18 DIAGNOSIS — Z12.11 ENCOUNTER FOR SCREENING COLONOSCOPY: ICD-10-CM

## 2024-03-20 RX ORDER — SODIUM CHLORIDE 0.9 % (FLUSH) 0.9 %
5-40 SYRINGE (ML) INJECTION EVERY 12 HOURS SCHEDULED
Status: CANCELLED | OUTPATIENT
Start: 2024-03-20

## 2024-03-20 RX ORDER — SODIUM CHLORIDE 0.9 % (FLUSH) 0.9 %
5-40 SYRINGE (ML) INJECTION PRN
Status: CANCELLED | OUTPATIENT
Start: 2024-03-20

## 2024-03-20 RX ORDER — SODIUM CHLORIDE 9 MG/ML
25 INJECTION, SOLUTION INTRAVENOUS PRN
Status: CANCELLED | OUTPATIENT
Start: 2024-03-20

## 2024-04-12 NOTE — PROGRESS NOTES
Spoke with pt regarding tomorrow's procedure. Pt verbalized understanding of 0645 early arrival time as well as  policy.

## 2024-04-12 NOTE — PERIOP NOTE
Patient verified name, , and procedure.    Type: 1a; abbreviated assessment per anesthesia guidelines    Labs per anesthesia: none    Instructed pt that they will be notified the day before their procedure by the GI Lab for time of arrival if their procedure is [place] and Pre-op for [facility] cases. Arrival times should be called by 5 pm. If no phone is received the patient should contact their respective hospital. The GI lab telephone number is [phone] and [facility] Pre-op is [phone].     Follow diet and prep instructions per office including NPO status.  If patient has NOT received instructions from office patient is advised to call surgeon office, verbalizes understanding.    Bath or shower the night before and the am of surgery with non-moisturizing soap. No lotions, oils, powders, cologne on skin. No make up, eye make up or jewelry. Wear loose fitting comfortable, clean clothing.     Must have adult present in building the entire time .     Medications for the day of procedure Duloxetine (Cymbalta), aripiprazole (Abilify), Tamsulosin (Flomax) Lansoprazole (Prevacid) , patient to hold baclofen (Lioresal), terbinafine (lamisil), Sildenafil (Viagra) hold 24 hours PTS per anesthesia guidelines.     The following discharge instructions reviewed with patient: medication given during procedure may cause drowsiness for several hours, therefore, do not drive or operate machinery for remainder of the day. You may not drink alcohol on the day of your procedure, please resume regular diet and activity unless otherwise directed. You may experience abdominal distention for several hours that is relieved by the passage of gas. Contact your physician if you have any of the following: fever or chills, severe abdominal pain or excessive amount of bleeding or a large amount when having a bowel movement. Occasional specks of blood with bowel movement would not be unusual.

## 2024-04-14 ENCOUNTER — ANESTHESIA EVENT (OUTPATIENT)
Dept: ENDOSCOPY | Age: 54
End: 2024-04-14
Payer: COMMERCIAL

## 2024-04-14 RX ORDER — LIDOCAINE HYDROCHLORIDE 10 MG/ML
1 INJECTION, SOLUTION INFILTRATION; PERINEURAL
Status: CANCELLED | OUTPATIENT
Start: 2024-04-14 | End: 2024-04-15

## 2024-04-14 RX ORDER — IBUPROFEN 600 MG/1
1 TABLET ORAL PRN
Status: CANCELLED | OUTPATIENT
Start: 2024-04-14

## 2024-04-14 RX ORDER — SODIUM CHLORIDE 0.9 % (FLUSH) 0.9 %
5-40 SYRINGE (ML) INJECTION PRN
Status: CANCELLED | OUTPATIENT
Start: 2024-04-14

## 2024-04-14 RX ORDER — NALOXONE HYDROCHLORIDE 0.4 MG/ML
INJECTION, SOLUTION INTRAMUSCULAR; INTRAVENOUS; SUBCUTANEOUS PRN
Status: CANCELLED | OUTPATIENT
Start: 2024-04-14

## 2024-04-14 RX ORDER — SODIUM CHLORIDE, SODIUM LACTATE, POTASSIUM CHLORIDE, CALCIUM CHLORIDE 600; 310; 30; 20 MG/100ML; MG/100ML; MG/100ML; MG/100ML
INJECTION, SOLUTION INTRAVENOUS CONTINUOUS
Status: CANCELLED | OUTPATIENT
Start: 2024-04-14

## 2024-04-14 RX ORDER — DEXTROSE MONOHYDRATE 100 MG/ML
INJECTION, SOLUTION INTRAVENOUS CONTINUOUS PRN
Status: CANCELLED | OUTPATIENT
Start: 2024-04-14

## 2024-04-14 RX ORDER — SODIUM CHLORIDE 0.9 % (FLUSH) 0.9 %
5-40 SYRINGE (ML) INJECTION EVERY 12 HOURS SCHEDULED
Status: CANCELLED | OUTPATIENT
Start: 2024-04-14

## 2024-04-14 RX ORDER — SODIUM CHLORIDE 9 MG/ML
INJECTION, SOLUTION INTRAVENOUS PRN
Status: CANCELLED | OUTPATIENT
Start: 2024-04-14

## 2024-04-14 NOTE — H&P
Kaiser Miles is 53 y.o. y/o male here for screening colonoscopy.    No FH of colon cancer, no acute symptoms.     Past Medical History:   Diagnosis Date    Anemia     Cancer (HCC)     testicular    Chronic pain     right shoulder, back, both hips     Depression     Early awakening     woke up during right shoulder surgery    GERD (gastroesophageal reflux disease)     controlled with meds    Insomnia     Kidney stone     Migraine     Nicotine vapor product user      Past Surgical History:   Procedure Laterality Date    HERNIA REPAIR Right 1978    KNEE ARTHROSCOPY Bilateral     ORTHOPEDIC SURGERY      artifical disc replacement at L5-S1    ORTHOPEDIC SURGERY      right hip impingement repair, microfracture, hip dysplasia    OTHER SURGICAL HISTORY Left     ankle    RADICAL ORCHIECTOMY Right 08/13/2019    SHOULDER ARTHROSCOPY Right 7/22/16    RIGHT SHOULDER ARTHROSCOPY, DISTAL CLAVICLE EXCISION, DECOMPRESSION, BICEPS TENOTOMY    SHOULDER ARTHROSCOPY Left     SLAP repair - and then was out     Family History   Problem Relation Age of Onset    Cancer Mother     Alzheimer's Disease Father     Psychiatric Disorder Father     Stroke Father     Cancer Father     Heart Disease Mother         ATRIAL FIB     Social History     Tobacco Use    Smoking status: Never    Smokeless tobacco: Current     Types: Chew     Last attempt to quit: 7/15/2014   Vaping Use    Vaping Use: Former    Substances: Nicotine    Devices: [employer] tank   Substance Use Topics    Alcohol use: No     Alcohol/week: 0.0 standard drinks of alcohol    Drug use: No     Allergies   Allergen Reactions    Amoxicillin-Pot Clavulanate Diarrhea    Morphine Nausea And Vomiting     Current Outpatient Medications   Medication Instructions    amitriptyline (ELAVIL) 25 MG tablet 1 TO 2 TABLETS BY MOUTH AS NEEDED AT BEDTIME    ARIPiprazole (ABILIFY) 10 MG tablet TAKE 1 TABLET BY MOUTH EVERY DAY    baclofen (LIORESAL) 10 mg, AS NEEDED    cloNIDine (CATAPRES) 0.1

## 2024-04-15 ENCOUNTER — ANESTHESIA (OUTPATIENT)
Dept: ENDOSCOPY | Age: 54
End: 2024-04-15
Payer: COMMERCIAL

## 2024-04-15 ENCOUNTER — HOSPITAL ENCOUNTER (OUTPATIENT)
Age: 54
Setting detail: OUTPATIENT SURGERY
Discharge: HOME | End: 2024-04-15
Attending: STUDENT IN AN ORGANIZED HEALTH CARE EDUCATION/TRAINING PROGRAM | Admitting: STUDENT IN AN ORGANIZED HEALTH CARE EDUCATION/TRAINING PROGRAM
Payer: COMMERCIAL

## 2024-04-15 VITALS
SYSTOLIC BLOOD PRESSURE: 111 MMHG | OXYGEN SATURATION: 98 % | TEMPERATURE: 97.5 F | WEIGHT: 158 LBS | HEART RATE: 71 BPM | HEIGHT: 68 IN | RESPIRATION RATE: 14 BRPM | BODY MASS INDEX: 23.95 KG/M2 | DIASTOLIC BLOOD PRESSURE: 72 MMHG

## 2024-04-15 PROCEDURE — 2709999900 HC NON-CHARGEABLE SUPPLY: Performed by: STUDENT IN AN ORGANIZED HEALTH CARE EDUCATION/TRAINING PROGRAM

## 2024-04-15 PROCEDURE — 7100000011 HC PHASE II RECOVERY - ADDTL 15 MIN: Performed by: STUDENT IN AN ORGANIZED HEALTH CARE EDUCATION/TRAINING PROGRAM

## 2024-04-15 PROCEDURE — 3700000001 HC ADD 15 MINUTES (ANESTHESIA): Performed by: STUDENT IN AN ORGANIZED HEALTH CARE EDUCATION/TRAINING PROGRAM

## 2024-04-15 PROCEDURE — 88305 TISSUE EXAM BY PATHOLOGIST: CPT

## 2024-04-15 PROCEDURE — 6360000002 HC RX W HCPCS: Performed by: NURSE ANESTHETIST, CERTIFIED REGISTERED

## 2024-04-15 PROCEDURE — 3700000000 HC ANESTHESIA ATTENDED CARE: Performed by: STUDENT IN AN ORGANIZED HEALTH CARE EDUCATION/TRAINING PROGRAM

## 2024-04-15 PROCEDURE — 2580000003 HC RX 258: Performed by: ANESTHESIOLOGY

## 2024-04-15 PROCEDURE — 3609010400 HC COLONOSCOPY POLYPECTOMY HOT BIOPSY: Performed by: STUDENT IN AN ORGANIZED HEALTH CARE EDUCATION/TRAINING PROGRAM

## 2024-04-15 PROCEDURE — 7100000010 HC PHASE II RECOVERY - FIRST 15 MIN: Performed by: STUDENT IN AN ORGANIZED HEALTH CARE EDUCATION/TRAINING PROGRAM

## 2024-04-15 RX ORDER — SODIUM CHLORIDE 0.9 % (FLUSH) 0.9 %
5-40 SYRINGE (ML) INJECTION EVERY 12 HOURS SCHEDULED
Status: DISCONTINUED | OUTPATIENT
Start: 2024-04-15 | End: 2024-04-15 | Stop reason: HOSPADM

## 2024-04-15 RX ORDER — SODIUM CHLORIDE 9 MG/ML
25 INJECTION, SOLUTION INTRAVENOUS PRN
Status: DISCONTINUED | OUTPATIENT
Start: 2024-04-15 | End: 2024-04-15 | Stop reason: HOSPADM

## 2024-04-15 RX ORDER — SODIUM CHLORIDE 0.9 % (FLUSH) 0.9 %
5-40 SYRINGE (ML) INJECTION PRN
Status: DISCONTINUED | OUTPATIENT
Start: 2024-04-15 | End: 2024-04-15 | Stop reason: HOSPADM

## 2024-04-15 RX ORDER — PROPOFOL 10 MG/ML
INJECTION, EMULSION INTRAVENOUS PRN
Status: DISCONTINUED | OUTPATIENT
Start: 2024-04-15 | End: 2024-04-15 | Stop reason: SDUPTHER

## 2024-04-15 RX ORDER — SODIUM CHLORIDE, SODIUM LACTATE, POTASSIUM CHLORIDE, CALCIUM CHLORIDE 600; 310; 30; 20 MG/100ML; MG/100ML; MG/100ML; MG/100ML
INJECTION, SOLUTION INTRAVENOUS CONTINUOUS
Status: DISCONTINUED | OUTPATIENT
Start: 2024-04-15 | End: 2024-04-15 | Stop reason: HOSPADM

## 2024-04-15 RX ORDER — SODIUM CHLORIDE 9 MG/ML
INJECTION, SOLUTION INTRAVENOUS PRN
Status: DISCONTINUED | OUTPATIENT
Start: 2024-04-15 | End: 2024-04-15 | Stop reason: HOSPADM

## 2024-04-15 RX ADMIN — SODIUM CHLORIDE, POTASSIUM CHLORIDE, SODIUM LACTATE AND CALCIUM CHLORIDE: 600; 310; 30; 20 INJECTION, SOLUTION INTRAVENOUS at 07:08

## 2024-04-15 RX ADMIN — PROPOFOL 40 MG: 10 INJECTION, EMULSION INTRAVENOUS at 08:14

## 2024-04-15 RX ADMIN — PROPOFOL 60 MG: 10 INJECTION, EMULSION INTRAVENOUS at 08:11

## 2024-04-15 RX ADMIN — PROPOFOL 40 MG: 10 INJECTION, EMULSION INTRAVENOUS at 08:17

## 2024-04-15 RX ADMIN — PROPOFOL 160 MCG/KG/MIN: 10 INJECTION, EMULSION INTRAVENOUS at 08:12

## 2024-04-15 ASSESSMENT — PAIN - FUNCTIONAL ASSESSMENT: PAIN_FUNCTIONAL_ASSESSMENT: 0-10

## 2024-04-15 NOTE — ANESTHESIA POSTPROCEDURE EVALUATION
Department of Anesthesiology  Postprocedure Note    Patient: Kaiser Miles  MRN: [id number]  YOB: 1970  Date of evaluation: 4/15/2024    Procedure Summary       Date: 04/15/24 Room / Location: [facility] / [facility] ENDOSCOPY    Anesthesia Start: 0807 Anesthesia Stop: 0831    Procedure: COLONOSCOPY POLYPECTOMY (Lower GI Region) Diagnosis:       Encounter for screening colonoscopy      (Encounter for screening colonoscopy [Z12.11])    Surgeons: Cherie Vallejo MD Responsible Provider: George Dyer MD    Anesthesia Type: TIVA ASA Status: 2            Anesthesia Type: No value filed.    Claude Phase I: Claude Score: 10    Claude Phase II: Claude Score: 10    Anesthesia Post Evaluation    Patient location during evaluation: PACU  Patient participation: complete - patient participated  Level of consciousness: awake and alert  Airway patency: patent  Nausea: well controlled.  Cardiovascular status: acceptable.  Respiratory status: acceptable  Hydration status: stable  Pain management: adequate    No notable events documented.

## 2024-04-15 NOTE — PROGRESS NOTES
VSS. Discharge instructions reviewed with patient and Lydia (wife) and copy of instructions sent home with patient.  Questions answered. Patient transferred out via wheelchair by endo staff. IV discontinued prior to discharge.

## 2024-04-15 NOTE — ANESTHESIA PRE PROCEDURE
Readings from Last 3 Encounters:   04/15/24 119/78   01/11/24 130/84   09/07/22 112/78       NPO Status:                                                                                 BMI:   Wt Readings from Last 3 Encounters:   04/12/24 71.7 kg (158 lb)   01/11/24 74.7 kg (164 lb 11.2 oz)   09/07/22 73.1 kg (161 lb 3.2 oz)     Body mass index is 24.02 kg/m².    CBC:   Lab Results   Component Value Date/Time    WBC 7.4 02/14/2024 08:53 AM    RBC 4.84 02/14/2024 08:53 AM    HGB 13.1 02/14/2024 08:53 AM    HCT 42.4 02/14/2024 08:53 AM    MCV 87.6 02/14/2024 08:53 AM    RDW 14.3 02/14/2024 08:53 AM     02/14/2024 08:53 AM       CMP:   Lab Results   Component Value Date/Time     01/11/2024 03:16 PM    K 5.1 01/11/2024 03:16 PM     01/11/2024 03:16 PM    CO2 28 01/11/2024 03:16 PM    BUN 16 01/11/2024 03:16 PM    CREATININE 1.00 01/11/2024 03:16 PM    GFRAA >60 08/22/2022 05:01 AM    AGRATIO 1.6 03/08/2024 08:29 AM    AGRATIO 1.0 09/15/2020 08:19 AM    LABGLOM >60 01/11/2024 03:16 PM    GLUCOSE 82 01/11/2024 03:16 PM    PROT 7.0 03/08/2024 08:29 AM    CALCIUM 9.6 01/11/2024 03:16 PM    BILITOT 0.3 03/08/2024 08:29 AM    ALKPHOS 128 03/08/2024 08:29 AM    ALKPHOS 80 06/11/2021 09:18 AM    AST 27 03/08/2024 08:29 AM    ALT 39 03/08/2024 08:29 AM       POC Tests: No results for input(s): \"POCGLU\", \"POCNA\", \"POCK\", \"POCCL\", \"POCBUN\", \"POCHEMO\", \"POCHCT\" in the last 72 hours.    Coags: No results found for: \"PROTIME\", \"INR\", \"APTT\"    HCG (If Applicable):   Lab Results   Component Value Date    HCGQUANT 2 02/14/2024        ABGs: No results found for: \"PHART\", \"PO2ART\", \"BBL4OZG\", \"PFA1LYG\", \"BEART\", \"Z7RPUMBK\"     Type & Screen (If Applicable):  No results found for: \"LABABO\", \"LABRH\"    Drug/Infectious Status (If Applicable):  Lab Results   Component Value Date/Time    HEPCAB <0.1 04/18/2019 09:56 AM       COVID-19 Screening (If Applicable): No results found for: \"COVID19\"        Anesthesia

## 2024-04-15 NOTE — OP NOTE
Operative Report    Patient: Kaiser Miles MRN: [id number]      YOB: 1970  Age: 53 y.o.  Sex: male            Indications:  Screening    Preoperative Evaluation: The patient was evaluated prior to the procedure in the GI lab admission area, the patient ASA was recorded .  Consent was obtained from the patient with the risk of perforation bleeding and aspiration.    Anesthesia: NAKIA-per anesthesia    Complications: None; patient tolerated the procedure well.    EBL -insignificant      Procedure: The patient was sedated in the left lateral decubitus position.  Scope was advanced from the rectum to the cecum.  Per gastroenterology society guideline recommendations, right side of the colon was examined twice. The scope was withdrawn to the rectum, retroflexed view was performed.  The rectal exam was normal.  Preparation was inadequate. [place] score of 3 .    Findings:    Incomplete preparation  One polyp, 3 mm in size, in the sigmoid colon, removed via biopsy forceps.    Postoperative Diagnosis:   One polyp    Recommendations:   Repeat colonoscopy with 2 day clears and double amount of bowel preparation.   Await for biopsy results, you will receive a pathology letter within 2 weeks.     Signed By:  Cherie Vallejo MD     April 15, 2024

## 2024-04-15 NOTE — DISCHARGE INSTRUCTIONS
Gastrointestinal Colonoscopy/Flexible Sigmoidoscopy - Lower Exam Discharge Instructions    Call Dr. Vallejo at [phone] for any problems or questions.    Contact the doctor’s office for follow up appointment as directed.    Medication may cause drowsiness for several hours, therefore:  Do not drive or operate machinery for reminder of the day.  No alcohol today.  Do not make any important or legal decisions for 24 hours.  Do not sign any legal documents for 24 hours.    Ordinarily, you may resume regular diet and activity after exam unless otherwise specified by your physician.    Because of air put into your colon during exam, you may experience some abdominal distension, relieved by the passage of gas, for several hours.    Contact your physician if you have any of the following:  Excessive amount of bleeding - large amount when having a bowel movement.  Occasional specks of blood with bowel movement would not be unusual.  Severe abdominal pain  Fever or Chills    Polyp Removal - follow these additional instructions   Take Metamucil - 1 tablespoon in juice every morning for 3 days, as needed for constipation.      Any additional instructions:      Repeat colonoscopy with 2 day clears and double amount of bowel preparation.   Await for biopsy results, you will receive a pathology letter within 2 weeks.

## 2024-04-17 PROBLEM — Z12.11 ENCOUNTER FOR SCREENING COLONOSCOPY: Status: RESOLVED | Noted: 2024-03-18 | Resolved: 2024-04-17

## 2024-04-18 ENCOUNTER — PREP FOR PROCEDURE (OUTPATIENT)
Dept: GASTROENTEROLOGY | Age: 54
End: 2024-04-18

## 2024-04-18 ENCOUNTER — TELEPHONE (OUTPATIENT)
Dept: GASTROENTEROLOGY | Age: 54
End: 2024-04-18

## 2024-04-18 DIAGNOSIS — Z86.010 HISTORY OF COLON POLYPS: ICD-10-CM

## 2024-04-18 PROBLEM — Z86.0100 HISTORY OF COLON POLYPS: Status: ACTIVE | Noted: 2024-04-18

## 2024-04-18 NOTE — TELEPHONE ENCOUNTER
Returned call to patient to schedule colon  / scheduled on 05/20/2024 [place] with [facility]  / mailed out Mirlax two day aggressive prep      Pt called to schedule another colonoscopy per Ertem

## 2024-04-21 RX ORDER — SODIUM CHLORIDE 0.9 % (FLUSH) 0.9 %
5-40 SYRINGE (ML) INJECTION PRN
Status: CANCELLED | OUTPATIENT
Start: 2024-04-21

## 2024-04-21 RX ORDER — SODIUM CHLORIDE 0.9 % (FLUSH) 0.9 %
5-40 SYRINGE (ML) INJECTION EVERY 12 HOURS SCHEDULED
Status: CANCELLED | OUTPATIENT
Start: 2024-04-21

## 2024-04-21 RX ORDER — SODIUM CHLORIDE 9 MG/ML
25 INJECTION, SOLUTION INTRAVENOUS PRN
Status: CANCELLED | OUTPATIENT
Start: 2024-04-21

## 2024-04-23 ENCOUNTER — HOSPITAL ENCOUNTER (OUTPATIENT)
Dept: CT IMAGING | Age: 54
Discharge: HOME | End: 2024-04-26
Attending: UROLOGY
Payer: COMMERCIAL

## 2024-04-23 ENCOUNTER — APPOINTMENT (OUTPATIENT)
Dept: CT IMAGING | Age: 54
End: 2024-04-23
Attending: UROLOGY
Payer: COMMERCIAL

## 2024-04-23 DIAGNOSIS — C62.01 MALIGNANT NEOPLASM OF UNDESCENDED RIGHT TESTIS (HCC): ICD-10-CM

## 2024-04-23 PROCEDURE — 74177 CT ABD & PELVIS W/CONTRAST: CPT

## 2024-04-23 PROCEDURE — 6360000004 HC RX CONTRAST MEDICATION: Performed by: UROLOGY

## 2024-04-23 RX ADMIN — IOPAMIDOL 100 ML: 755 INJECTION, SOLUTION INTRAVENOUS at 16:01

## 2024-04-23 RX ADMIN — DIATRIZOATE MEGLUMINE AND DIATRIZOATE SODIUM 15 ML: 660; 100 LIQUID ORAL; RECTAL at 16:01

## 2024-04-30 DIAGNOSIS — N40.0 BENIGN PROSTATIC HYPERPLASIA WITHOUT LOWER URINARY TRACT SYMPTOMS: ICD-10-CM

## 2024-04-30 DIAGNOSIS — E29.1 HYPOGONADISM IN MALE: Primary | ICD-10-CM

## 2024-04-30 DIAGNOSIS — C62.10 MALIGNANT NEOPLASM OF DESCENDED TESTIS, UNSPECIFIED LATERALITY (HCC): ICD-10-CM

## 2024-05-14 NOTE — PROGRESS NOTES
Patient verified name, , and procedure.    Type: 1a; abbreviated assessment per anesthesia guidelines.    Labs ordered per anesthesia: None.    Instructed pt that they will be notified the day before their procedure by the GI Lab of the time of arrival if their procedure is [place], and by Pre-op for [facility] procedures. Arrival times should be called by 5 pm. If no phone call is received, the patient should contact the respective hospital. The GI Lab telephone number is [phone] and [facility] Pre-Op is [phone].     Instructed pt to follow diet and prep instructions, per MD, including NPO status. If patient has NOT received instructions from office, patient advised to call the MD's office as soon as possible. Phone number provided.    Pt may bathe or shower the night before and the am of surgery with non-moisturizing soap. No lotions, oils, powders, make-up or colognes/perfumes on skin. No jewelry. Wear loose-fitting, comfortable, clean clothing.     Pt must have adult present in building the entire time that can drive them home.     Medications to take on the day of procedure: Cymbalta, Abilify, Flomax, and Prevacid.    Medications to hold: Viagra, per anesthesia guidelines.     The following discharge instructions were reviewed with patient: medication given during procedure may cause drowsiness for several hours, therefore, do not drive or operate machinery for remainder of the day. You may not drink alcohol on the day of your procedure, please resume regular diet and activity unless otherwise directed. You may experience abdominal distention for several hours that is relieved by the passage of gas. Contact your physician if you have any of the following: fever or chills, severe abdominal pain, or excessive amount of bleeding or a large amount when having a bowel movement. Occasional specks of blood with bowel movement would not be unusual.    Opportunity for questions and clarification was

## 2024-05-17 NOTE — PROGRESS NOTES
Unable to reach patient at this time to confirm arrival time and procedure. Voicemail left and call back number provided.

## 2024-05-19 ENCOUNTER — ANESTHESIA EVENT (OUTPATIENT)
Dept: ENDOSCOPY | Age: 54
End: 2024-05-19
Payer: COMMERCIAL

## 2024-05-19 RX ORDER — ONDANSETRON 2 MG/ML
4 INJECTION INTRAMUSCULAR; INTRAVENOUS
Status: CANCELLED | OUTPATIENT
Start: 2024-05-19 | End: 2024-05-20

## 2024-05-19 RX ORDER — SODIUM CHLORIDE 0.9 % (FLUSH) 0.9 %
5-40 SYRINGE (ML) INJECTION PRN
Status: CANCELLED | OUTPATIENT
Start: 2024-05-19

## 2024-05-19 RX ORDER — SODIUM CHLORIDE 0.9 % (FLUSH) 0.9 %
5-40 SYRINGE (ML) INJECTION EVERY 12 HOURS SCHEDULED
Status: CANCELLED | OUTPATIENT
Start: 2024-05-19

## 2024-05-19 RX ORDER — SODIUM CHLORIDE 9 MG/ML
INJECTION, SOLUTION INTRAVENOUS PRN
Status: CANCELLED | OUTPATIENT
Start: 2024-05-19

## 2024-05-19 RX ORDER — NALOXONE HYDROCHLORIDE 0.4 MG/ML
INJECTION, SOLUTION INTRAMUSCULAR; INTRAVENOUS; SUBCUTANEOUS PRN
Status: CANCELLED | OUTPATIENT
Start: 2024-05-19

## 2024-05-20 ENCOUNTER — ANESTHESIA (OUTPATIENT)
Dept: ENDOSCOPY | Age: 54
End: 2024-05-20
Payer: COMMERCIAL

## 2024-05-20 ENCOUNTER — HOSPITAL ENCOUNTER (OUTPATIENT)
Age: 54
Setting detail: OUTPATIENT SURGERY
Discharge: HOME | End: 2024-05-20
Attending: STUDENT IN AN ORGANIZED HEALTH CARE EDUCATION/TRAINING PROGRAM | Admitting: STUDENT IN AN ORGANIZED HEALTH CARE EDUCATION/TRAINING PROGRAM
Payer: COMMERCIAL

## 2024-05-20 VITALS
RESPIRATION RATE: 24 BRPM | BODY MASS INDEX: 24.25 KG/M2 | DIASTOLIC BLOOD PRESSURE: 85 MMHG | SYSTOLIC BLOOD PRESSURE: 103 MMHG | HEIGHT: 68 IN | WEIGHT: 160 LBS | HEART RATE: 75 BPM | TEMPERATURE: 98.6 F | OXYGEN SATURATION: 98 %

## 2024-05-20 PROCEDURE — 3609010600 HC COLONOSCOPY POLYPECTOMY SNARE/COLD BIOPSY: Performed by: STUDENT IN AN ORGANIZED HEALTH CARE EDUCATION/TRAINING PROGRAM

## 2024-05-20 PROCEDURE — 2500000003 HC RX 250 WO HCPCS: Performed by: NURSE ANESTHETIST, CERTIFIED REGISTERED

## 2024-05-20 PROCEDURE — 7100000011 HC PHASE II RECOVERY - ADDTL 15 MIN: Performed by: STUDENT IN AN ORGANIZED HEALTH CARE EDUCATION/TRAINING PROGRAM

## 2024-05-20 PROCEDURE — 2709999900 HC NON-CHARGEABLE SUPPLY: Performed by: STUDENT IN AN ORGANIZED HEALTH CARE EDUCATION/TRAINING PROGRAM

## 2024-05-20 PROCEDURE — 3700000001 HC ADD 15 MINUTES (ANESTHESIA): Performed by: STUDENT IN AN ORGANIZED HEALTH CARE EDUCATION/TRAINING PROGRAM

## 2024-05-20 PROCEDURE — 6360000002 HC RX W HCPCS: Performed by: NURSE ANESTHETIST, CERTIFIED REGISTERED

## 2024-05-20 PROCEDURE — 2580000003 HC RX 258: Performed by: ANESTHESIOLOGY

## 2024-05-20 PROCEDURE — 7100000010 HC PHASE II RECOVERY - FIRST 15 MIN: Performed by: STUDENT IN AN ORGANIZED HEALTH CARE EDUCATION/TRAINING PROGRAM

## 2024-05-20 PROCEDURE — 88305 TISSUE EXAM BY PATHOLOGIST: CPT

## 2024-05-20 PROCEDURE — 3700000000 HC ANESTHESIA ATTENDED CARE: Performed by: STUDENT IN AN ORGANIZED HEALTH CARE EDUCATION/TRAINING PROGRAM

## 2024-05-20 RX ORDER — EPHEDRINE SULFATE 5 MG/ML
INJECTION INTRAVENOUS PRN
Status: DISCONTINUED | OUTPATIENT
Start: 2024-05-20 | End: 2024-05-20 | Stop reason: SDUPTHER

## 2024-05-20 RX ORDER — SODIUM CHLORIDE 0.9 % (FLUSH) 0.9 %
5-40 SYRINGE (ML) INJECTION PRN
Status: DISCONTINUED | OUTPATIENT
Start: 2024-05-20 | End: 2024-05-20 | Stop reason: HOSPADM

## 2024-05-20 RX ORDER — LIDOCAINE HYDROCHLORIDE 10 MG/ML
1 INJECTION, SOLUTION INFILTRATION; PERINEURAL
Status: DISCONTINUED | OUTPATIENT
Start: 2024-05-20 | End: 2024-05-20 | Stop reason: HOSPADM

## 2024-05-20 RX ORDER — LIDOCAINE HYDROCHLORIDE 20 MG/ML
INJECTION, SOLUTION EPIDURAL; INFILTRATION; INTRACAUDAL; PERINEURAL PRN
Status: DISCONTINUED | OUTPATIENT
Start: 2024-05-20 | End: 2024-05-20 | Stop reason: SDUPTHER

## 2024-05-20 RX ORDER — SODIUM CHLORIDE 9 MG/ML
INJECTION, SOLUTION INTRAVENOUS PRN
Status: DISCONTINUED | OUTPATIENT
Start: 2024-05-20 | End: 2024-05-20 | Stop reason: HOSPADM

## 2024-05-20 RX ORDER — SODIUM CHLORIDE 0.9 % (FLUSH) 0.9 %
5-40 SYRINGE (ML) INJECTION EVERY 12 HOURS SCHEDULED
Status: DISCONTINUED | OUTPATIENT
Start: 2024-05-20 | End: 2024-05-20 | Stop reason: HOSPADM

## 2024-05-20 RX ORDER — SODIUM CHLORIDE, SODIUM LACTATE, POTASSIUM CHLORIDE, CALCIUM CHLORIDE 600; 310; 30; 20 MG/100ML; MG/100ML; MG/100ML; MG/100ML
INJECTION, SOLUTION INTRAVENOUS CONTINUOUS
Status: DISCONTINUED | OUTPATIENT
Start: 2024-05-20 | End: 2024-05-20 | Stop reason: HOSPADM

## 2024-05-20 RX ORDER — PROPOFOL 10 MG/ML
INJECTION, EMULSION INTRAVENOUS CONTINUOUS PRN
Status: DISCONTINUED | OUTPATIENT
Start: 2024-05-20 | End: 2024-05-20 | Stop reason: SDUPTHER

## 2024-05-20 RX ADMIN — SODIUM CHLORIDE, POTASSIUM CHLORIDE, SODIUM LACTATE AND CALCIUM CHLORIDE: 600; 310; 30; 20 INJECTION, SOLUTION INTRAVENOUS at 07:35

## 2024-05-20 RX ADMIN — LIDOCAINE HYDROCHLORIDE 100 MG: 20 INJECTION, SOLUTION EPIDURAL; INFILTRATION; INTRACAUDAL; PERINEURAL at 08:36

## 2024-05-20 RX ADMIN — PROPOFOL 100 MG: 10 INJECTION, EMULSION INTRAVENOUS at 08:45

## 2024-05-20 RX ADMIN — PROPOFOL 200 MCG/KG/MIN: 10 INJECTION, EMULSION INTRAVENOUS at 08:36

## 2024-05-20 RX ADMIN — EPHEDRINE SULFATE 10 MG: 5 INJECTION INTRAVENOUS at 08:45

## 2024-05-20 RX ADMIN — PROPOFOL 100 MG: 10 INJECTION, EMULSION INTRAVENOUS at 08:37

## 2024-05-20 ASSESSMENT — PAIN - FUNCTIONAL ASSESSMENT: PAIN_FUNCTIONAL_ASSESSMENT: NONE - DENIES PAIN

## 2024-05-20 NOTE — DISCHARGE INSTRUCTIONS
Gastrointestinal Colonoscopy/Flexible Sigmoidoscopy - Lower Exam Discharge Instructions  Call Dr. Vallejo at [phone] for any problems or questions.  Contact the doctor’s office for follow up appointment as directed  Medication may cause drowsiness for several hours, therefore:  Do not drive or operate machinery for reminder of the day.  No alcohol today.  Do not make any important or legal decisions for 24 hours.  Do not sign any legal documents for 24 hours.  Ordinarily, you may resume regular diet and activity after exam unless otherwise specified by your physician.  Because of air put into your colon during exam, you may experience some abdominal distension, relieved by the passage of gas, for several hours.  Contact your physician if you have any of the following:  Excessive amount of bleeding - large amount when having a bowel movement.  Occasional specks of blood with bowel movement would not be unusual.  Severe abdominal pain  Fever or Chills  Polyp Removal - follow these additional instructions  Soft diet for 24 hours, then resume regular diet   Take Metamucil - 1 tablespoon in juice every morning for 3 days  No Aspirin, Advil, Aleve, Nuprin, Ibuprofen, or medications that contain these drugs for 2 weeks.    Any additional instructions:     Repeat colonoscopy in 3 years  Await for biopsy results, you will receive a pathology letter within 2 weeks.

## 2024-05-20 NOTE — H&P
Kaiser Miles is 54 y.o. y/o male here for screening colonoscopy.    No immediate (parents/siblings) FH of colon cancer, no acute symptoms.     Past Medical History:   Diagnosis Date    Anemia     Cancer (HCC)     testicular    Chronic pain     right shoulder, back, both hips     Depression     Early awakening     woke up during right shoulder surgery    GERD (gastroesophageal reflux disease)     controlled with meds    Insomnia     Kidney stone     Migraine     Nicotine vapor product user      Past Surgical History:   Procedure Laterality Date    COLONOSCOPY N/A 4/15/2024    COLONOSCOPY POLYPECTOMY performed by Cherie Vallejo MD at [facility] ENDOSCOPY    HERNIA REPAIR Right 1978    KNEE ARTHROSCOPY Bilateral     ORTHOPEDIC SURGERY      artifical disc replacement at L5-S1    ORTHOPEDIC SURGERY      right hip impingement repair, microfracture, hip dysplasia    OTHER SURGICAL HISTORY Left     ankle    RADICAL ORCHIECTOMY Right 08/13/2019    SHOULDER ARTHROSCOPY Right 7/22/16    RIGHT SHOULDER ARTHROSCOPY, DISTAL CLAVICLE EXCISION, DECOMPRESSION, BICEPS TENOTOMY    SHOULDER ARTHROSCOPY Left     SLAP repair - and then was out     Family History   Problem Relation Age of Onset    Cancer Mother     Alzheimer's Disease Father     Psychiatric Disorder Father     Stroke Father     Cancer Father     Heart Disease Mother         ATRIAL FIB     Social History     Tobacco Use    Smoking status: Never    Smokeless tobacco: Current     Types: Chew     Last attempt to quit: 7/15/2014   Vaping Use    Vaping Use: Former    Substances: Nicotine    Devices: [employer] tank   Substance Use Topics    Alcohol use: No     Alcohol/week: 0.0 standard drinks of alcohol    Drug use: No     Allergies   Allergen Reactions    Amoxicillin-Pot Clavulanate Diarrhea    Morphine Nausea And Vomiting     Current Outpatient Medications   Medication Instructions    amitriptyline (ELAVIL) 25 MG tablet 1 TO 2 TABLETS BY MOUTH AS NEEDED AT BEDTIME

## 2024-05-20 NOTE — ANESTHESIA PRE PROCEDURE
Department of Anesthesiology  Preprocedure Note       Name:  Kaiser Miles   Age:  54 y.o.  :  1970                                          MRN:  [id number]         Date:  2024      Surgeon: Surgeon(s):  Cherie Vallejo MD    Procedure: Procedure(s):  COLORECTAL CANCER SCREENING, NOT HIGH RISK    Medications prior to admission:   Prior to Admission medications    Medication Sig Start Date End Date Taking? Authorizing Provider   NEEDLE, DISP, 18 G (EASYPOINT NEEDLE) 18G X 1\" MISC Use as needed for testosterone injections 3/14/24   Toño Porras MD   NEEDLE, DISP, 21 G (EASYPOINT NEEDLE) 21G X 1-1/2\" MISC Use as needed for testosterone injections. 3/14/24   Toño Porras MD   terbinafine (LAMISIL) 250 MG tablet Take 1 tablet by mouth daily 3/11/24 7/9/24  Kayla Christian PA   testosterone cypionate (DEPOTESTOTERONE CYPIONATE) 100 MG/ML injection Inject 1 mL into the muscle every 14 days. Max Daily Amount: 100 mg 2/15/24 2/14/25  Toño Porras MD   baclofen (LIORESAL) 10 MG tablet 1 tablet as needed  Patient not taking: Reported on 2024   Provider, MD Padma   lansoprazole (PREVACID) 30 MG delayed release capsule TAKE 1 CAPSULE BY MOUTH EVERY OTHER DAY  Patient taking differently: daily TAKE 1 CAPSULE BY MOUTH EVERY OTHER DAY 24   Kayla Christian PA   sildenafil (VIAGRA) 100 MG tablet Take 1 tablet by mouth daily as needed for Erectile Dysfunction 24   Kayla Christian PA   SUMAtriptan (IMITREX) 100 MG tablet Take one prn migraine. May repeat in two hours if needed. May take with two Advil Liquigels. Max daily dose= 2 tabs 24   Kayla Christian PA   zoster recombinant adjuvanted vaccine (SHINGRIX) 50 MCG/0.5ML SUSR injection Inject 0.5 mLs into the muscle See Admin Instructions 1 dose now and repeat in 2-6 months 24  Kayla Christian PA   tamsulosin (FLOMAX) 0.4 MG capsule TAKE 1 CAPSULE BY MOUTH

## 2024-05-20 NOTE — OP NOTE
Operative Report    Patient: Kaiser Miles MRN: [id number]      YOB: 1970  Age: 54 y.o.  Sex: male            Indications:  Screening colonoscopy.     Preoperative Evaluation: The patient was evaluated prior to the procedure in the GI lab admission area, the patient ASA was recorded .  Consent was obtained from the patient with the risk of perforation bleeding and aspiration.    Anesthesia: NAKIA-per anesthesia    Complications: None; patient tolerated the procedure well.    EBL -insignificant      Procedure: The patient was sedated in the left lateral decubitus position.  Scope was advanced from the rectum to the cecum.  Per gastroenterology society guideline recommendations, right side of the colon was examined twice.    The scope was withdrawn to the rectum, retroflexed view was performed.  The rectal exam was normal.  Preparation was adequate. [place] score of 9.    Findings:    Normal cecum, ascending, transverse, sigmoid colon and rectum.  No polyps were found.   Internal hemorrhoids     Postoperative Diagnosis:   Normal colonoscopy.     Recommendations:   Repeat colonoscopy in 10 years.     Signed By:  Cherie Vallejo MD     May 20, 2024

## 2024-05-20 NOTE — ANESTHESIA POSTPROCEDURE EVALUATION
Department of Anesthesiology  Postprocedure Note    Patient: Kaiser Miles  MRN: [id number]  YOB: 1970  Date of evaluation: 5/20/2024    Procedure Summary       Date: 05/20/24 Room / Location: [facility] ENDO 03 / [facility] ENDOSCOPY    Anesthesia Start: 0828 Anesthesia Stop: 0902    Procedure: COLONOSCOPY POLYPECTOMY SNARE BIOPSY Diagnosis:       History of colon polyps      (History of colon polyps [Z86.010])    Surgeons: Cherie Vallejo MD Responsible Provider: Leandra Gonsalves MD    Anesthesia Type: TIVA ASA Status: 2            Anesthesia Type: TIVA    Claude Phase I: Claude Score: 10    Claude Phase II: Claude Score: 10    Anesthesia Post Evaluation    Patient location during evaluation: PACU  Patient participation: complete - patient participated  Level of consciousness: awake and alert  Airway patency: patent  Nausea & Vomiting: no nausea  Cardiovascular status: hemodynamically stable  Respiratory status: acceptable  Hydration status: euvolemic  Comments: Blood pressure 103/85, pulse 75, temperature 98.6 °F (37 °C), temperature source Skin, resp. rate 24, height 1.727 m (5' 8\"), weight 72.6 kg (160 lb), SpO2 98 %.   Pain management: adequate and satisfactory to patient        No notable events documented.

## 2024-06-17 NOTE — PROGRESS NOTES
Kaiser Miles (: 1970) is a 54 y.o. male, an established patient, is here for evaluation of the following chief complaint(s):  Chief Complaint   Patient presents with    Mass          ASSESSMENT/PLAN:  Kaiser was seen today for mass.    Diagnoses and all orders for this visit:    Mass of soft tissue of upper arm  -     US EXTREMITY LEFT NON VASC LIMITED; Future    Benign prostatic hyperplasia with incomplete bladder emptying  -     tamsulosin (FLOMAX) 0.4 MG capsule; Take 1 capsule by mouth in the morning and at bedtime        He has a 4 x 4 cm diam cyst vs lipoma on back of left upper arm.  No pain or tenderness.  No signs of infection.  Will send him for a soft tissue US to evaluate and will f/u once I have these results.      Followed by urology for BPH, h/o rt. Testicular CA.  --states that he was recently instucted by them to increase flomax to BID dosing.  He is now out of medication (rx in system has instructions for 1x/daily).  Agreed to write a 30 day supply and he will f/u w/ Urology to get future rx's.      Follow Up    Return for F/u as scheduled.     SUBJECTIVE/OBJECTIVE:  HPI    24:  LFT's elevated.  Lamisil not prescribed.  Recheck LFT's in a couple of weeks.  Will also add Ferritin due to chronic anemia.   3/11/24: LFTs back to normal.  Ferritin level low.    3/11/24:  Lamisil sent in since LFT's are normal.   24:  colonoscopy performed.  Repeat 3 years.     At today's visit:     Lump on back of left arm X 3 weeks    Vitals:    24 0946   BP: 120/80   Pulse: 87   Resp: 18   Temp: 98.2 °F (36.8 °C)   TempSrc: Oral   SpO2: 98%   Weight: 71.2 kg (157 lb)   Height: 1.727 m (5' 8\")              Orders Placed This Encounter    US EXTREMITY LEFT NON VASC LIMITED     This procedure can be scheduled via [place].      Standing Status:   Future     Standing Expiration Date:   2025     Order Specific Question:   Reason for exam:     Answer:   cyst on back of left upper arm

## 2024-06-20 ENCOUNTER — OFFICE VISIT (OUTPATIENT)
Dept: FAMILY MEDICINE CLINIC | Facility: CLINIC | Age: 54
End: 2024-06-20
Payer: COMMERCIAL

## 2024-06-20 VITALS
BODY MASS INDEX: 23.79 KG/M2 | OXYGEN SATURATION: 98 % | TEMPERATURE: 98.2 F | WEIGHT: 157 LBS | RESPIRATION RATE: 18 BRPM | HEIGHT: 68 IN | DIASTOLIC BLOOD PRESSURE: 80 MMHG | HEART RATE: 87 BPM | SYSTOLIC BLOOD PRESSURE: 120 MMHG

## 2024-06-20 DIAGNOSIS — M79.89 MASS OF SOFT TISSUE OF UPPER ARM: Primary | ICD-10-CM

## 2024-06-20 DIAGNOSIS — N40.1 BENIGN PROSTATIC HYPERPLASIA WITH INCOMPLETE BLADDER EMPTYING: ICD-10-CM

## 2024-06-20 DIAGNOSIS — R39.14 BENIGN PROSTATIC HYPERPLASIA WITH INCOMPLETE BLADDER EMPTYING: ICD-10-CM

## 2024-06-20 PROCEDURE — 99214 OFFICE O/P EST MOD 30 MIN: CPT | Performed by: PHYSICIAN ASSISTANT

## 2024-06-20 RX ORDER — TAMSULOSIN HYDROCHLORIDE 0.4 MG/1
0.4 CAPSULE ORAL 2 TIMES DAILY
Qty: 60 CAPSULE | Refills: 0 | Status: SHIPPED | OUTPATIENT
Start: 2024-06-20

## 2024-06-21 DIAGNOSIS — R39.14 BENIGN PROSTATIC HYPERPLASIA WITH INCOMPLETE BLADDER EMPTYING: ICD-10-CM

## 2024-06-21 DIAGNOSIS — N40.1 BENIGN PROSTATIC HYPERPLASIA WITH INCOMPLETE BLADDER EMPTYING: ICD-10-CM

## 2024-06-21 NOTE — TELEPHONE ENCOUNTER
From: Kaiser Miles  To: Office of Kayla Christian  Sent: 6/20/2024 10:43 AM EDT  Subject: Medication Renewal Request    Refills have been requested for the following medications:     tamsulosin (FLOMAX) 0.4 MG capsule [DAMI Carney]    Preferred pharmacy: [facility] #[id number] - [address] [phone] -  [phone]

## 2024-06-21 NOTE — TELEPHONE ENCOUNTER
Next appt on 1/14/25 with labs before the appt.      Last filled on 6/20/24 with 60 and 0 refills.  Take one BID.

## 2024-06-24 RX ORDER — TAMSULOSIN HYDROCHLORIDE 0.4 MG/1
0.4 CAPSULE ORAL 2 TIMES DAILY
Qty: 180 CAPSULE | Refills: 2 | OUTPATIENT
Start: 2024-06-24

## 2024-06-24 NOTE — TELEPHONE ENCOUNTER
Pt was given a 30 day supply of this at his visit last week and will contact Urology for future refills since they are the prescribers for this medication.

## 2024-06-25 ENCOUNTER — HOSPITAL ENCOUNTER (OUTPATIENT)
Dept: ULTRASOUND IMAGING | Age: 54
Discharge: HOME | End: 2024-06-28
Payer: COMMERCIAL

## 2024-06-25 DIAGNOSIS — M79.89 MASS OF SOFT TISSUE OF UPPER ARM: ICD-10-CM

## 2024-06-25 PROCEDURE — 76882 US LMTD JT/FCL EVL NVASC XTR: CPT

## 2024-07-05 DIAGNOSIS — N52.9 ERECTILE DYSFUNCTION, UNSPECIFIED ERECTILE DYSFUNCTION TYPE: ICD-10-CM

## 2024-07-05 RX ORDER — SILDENAFIL 100 MG/1
100 TABLET, FILM COATED ORAL DAILY PRN
Qty: 30 TABLET | Refills: 1 | Status: SHIPPED | OUTPATIENT
Start: 2024-07-05

## 2024-07-05 NOTE — TELEPHONE ENCOUNTER
Name from pharmacy: SILDENAFIL 100MG TABLETS          Will file in chart as: sildenafil (VIAGRA) 100 MG tablet    Sig: Take 1 tablet by mouth daily as needed for Erectile Dysfunction    Disp: 30 tablet    Refills: 1 (Pharmacy requested: Not specified)    Start: 7/5/2024    Class: Normal    Non-formulary For: Erectile dysfunction, unspecified erectile dysfunction type    Last ordered: 5 months ago (1/11/2024) by DAMI Carney    Last refill: 6/4/2024    Rx #: [id number]    Erectile Dysfunction Medication Protocol [id number]/05/2024 04:10 AM    No Unstable Angina in the Problem list    Absence of nitrates on med list    Visit with authorizing provider in past 9 months or upcoming 90 days      To be filled at: [id number] - [facility], [address] - P [phone] - F [phone]

## 2024-07-15 ENCOUNTER — PATIENT MESSAGE (OUTPATIENT)
Dept: UROLOGY | Age: 54
End: 2024-07-15

## 2024-07-15 DIAGNOSIS — R39.14 BENIGN PROSTATIC HYPERPLASIA WITH INCOMPLETE BLADDER EMPTYING: ICD-10-CM

## 2024-07-15 DIAGNOSIS — N40.1 BENIGN PROSTATIC HYPERPLASIA WITH INCOMPLETE BLADDER EMPTYING: ICD-10-CM

## 2024-07-15 RX ORDER — TAMSULOSIN HYDROCHLORIDE 0.4 MG/1
0.4 CAPSULE ORAL 2 TIMES DAILY
Qty: 90 CAPSULE | Refills: 3 | Status: SHIPPED | OUTPATIENT
Start: 2024-07-15

## 2024-07-15 RX ORDER — TAMSULOSIN HYDROCHLORIDE 0.4 MG/1
CAPSULE ORAL
Qty: 180 CAPSULE | Refills: 1 | OUTPATIENT
Start: 2024-07-15

## 2024-07-15 NOTE — TELEPHONE ENCOUNTER
From: Kaiser Miles  To: Dr. Toño Porras  Sent: 7/15/2024 1:59 PM EDT  Subject: Tamsulosin    Will u please refill my tamsulosin prescription .4 mg BID? This not a rx i want to run out of…life is very difficult without this medication. Thank you.

## 2024-07-26 DIAGNOSIS — R39.14 BENIGN PROSTATIC HYPERPLASIA WITH INCOMPLETE BLADDER EMPTYING: ICD-10-CM

## 2024-07-26 DIAGNOSIS — N40.1 BENIGN PROSTATIC HYPERPLASIA WITH INCOMPLETE BLADDER EMPTYING: ICD-10-CM

## 2024-07-26 RX ORDER — TAMSULOSIN HYDROCHLORIDE 0.4 MG/1
CAPSULE ORAL
Qty: 90 CAPSULE | Refills: 3 | OUTPATIENT
Start: 2024-07-26

## 2024-09-25 PROBLEM — D17.21 LIPOMA OF RIGHT UPPER EXTREMITY: Status: ACTIVE | Noted: 2024-09-25

## 2024-09-26 ENCOUNTER — OFFICE VISIT (OUTPATIENT)
Dept: FAMILY MEDICINE CLINIC | Facility: CLINIC | Age: 54
End: 2024-09-26

## 2024-09-26 VITALS
WEIGHT: 155.4 LBS | HEIGHT: 68 IN | SYSTOLIC BLOOD PRESSURE: 130 MMHG | HEART RATE: 80 BPM | BODY MASS INDEX: 23.55 KG/M2 | OXYGEN SATURATION: 96 % | TEMPERATURE: 97.9 F | RESPIRATION RATE: 14 BRPM | DIASTOLIC BLOOD PRESSURE: 84 MMHG

## 2024-09-26 DIAGNOSIS — F40.240 CLAUSTROPHOBIA: ICD-10-CM

## 2024-09-26 DIAGNOSIS — G43.009 MIGRAINE WITHOUT AURA AND WITHOUT STATUS MIGRAINOSUS, NOT INTRACTABLE: ICD-10-CM

## 2024-09-26 DIAGNOSIS — Z23 NEEDS FLU SHOT: ICD-10-CM

## 2024-09-26 DIAGNOSIS — M51.24 HERNIATION OF INTERVERTEBRAL DISC OF THORACIC REGION: Primary | ICD-10-CM

## 2024-09-26 DIAGNOSIS — K21.9 GASTROESOPHAGEAL REFLUX DISEASE WITHOUT ESOPHAGITIS: ICD-10-CM

## 2024-09-26 DIAGNOSIS — M54.6 ACUTE RIGHT-SIDED THORACIC BACK PAIN: ICD-10-CM

## 2024-09-26 RX ORDER — SUMATRIPTAN 100 MG/1
TABLET, FILM COATED ORAL
Qty: 27 TABLET | Refills: 3 | Status: SHIPPED | OUTPATIENT
Start: 2024-09-26

## 2024-09-26 RX ORDER — TRAMADOL HYDROCHLORIDE 50 MG/1
50 TABLET ORAL 2 TIMES DAILY PRN
Qty: 10 TABLET | Refills: 0 | Status: SHIPPED | OUTPATIENT
Start: 2024-09-26 | End: 2024-10-01

## 2024-09-26 RX ORDER — LANSOPRAZOLE 30 MG/1
30 CAPSULE, DELAYED RELEASE ORAL EVERY OTHER DAY
Qty: 90 CAPSULE | Refills: 3 | Status: SHIPPED | OUTPATIENT
Start: 2024-09-26

## 2024-09-26 RX ORDER — ALPRAZOLAM 0.5 MG
TABLET ORAL
Qty: 1 TABLET | Refills: 0 | Status: SHIPPED | OUTPATIENT
Start: 2024-09-26 | End: 2024-10-26

## 2024-10-16 DIAGNOSIS — M51.24 HERNIATION OF INTERVERTEBRAL DISC OF THORACIC REGION: Primary | ICD-10-CM

## 2024-10-16 DIAGNOSIS — M54.6 ACUTE RIGHT-SIDED THORACIC BACK PAIN: ICD-10-CM

## 2024-10-16 RX ORDER — TRAMADOL HYDROCHLORIDE 50 MG/1
50 TABLET ORAL 2 TIMES DAILY PRN
Qty: 60 TABLET | Refills: 0 | Status: SHIPPED | OUTPATIENT
Start: 2024-10-16 | End: 2024-11-15

## 2024-10-16 NOTE — PROGRESS NOTES
10/16/2024: Sent in an additional 30-day supply of tramadol for back pain.  MRI to be done end of the month.

## 2024-10-22 ENCOUNTER — TELEPHONE (OUTPATIENT)
Dept: UROLOGY | Age: 54
End: 2024-10-22

## 2024-10-22 DIAGNOSIS — N40.1 BENIGN PROSTATIC HYPERPLASIA WITH URINARY FREQUENCY: Primary | ICD-10-CM

## 2024-10-22 DIAGNOSIS — R35.0 BENIGN PROSTATIC HYPERPLASIA WITH URINARY FREQUENCY: Primary | ICD-10-CM

## 2024-10-22 RX ORDER — SILODOSIN 8 MG/1
8 CAPSULE ORAL EVERY EVENING
Qty: 30 CAPSULE | Refills: 11 | Status: SHIPPED | OUTPATIENT
Start: 2024-10-22

## 2024-10-22 NOTE — TELEPHONE ENCOUNTER
Patient called in with weak dennis.  Flomax BID not helping.  Wants something stronger.  Previously tried adding on finasteride without success.    Will try rapaflo 8 mg.  Sent to pharmacy.     Toño Porras M.D.    [facility]  [address] [address]  Phone: [phone]  Fax: [phone]

## 2024-10-29 ENCOUNTER — HOSPITAL ENCOUNTER (OUTPATIENT)
Dept: MRI IMAGING | Age: 54
Discharge: HOME | End: 2024-11-01
Payer: COMMERCIAL

## 2024-10-29 DIAGNOSIS — M51.24 HERNIATION OF INTERVERTEBRAL DISC OF THORACIC REGION: ICD-10-CM

## 2024-10-29 DIAGNOSIS — M54.6 ACUTE RIGHT-SIDED THORACIC BACK PAIN: ICD-10-CM

## 2024-10-29 PROCEDURE — A9579 GAD-BASE MR CONTRAST NOS,1ML: HCPCS | Performed by: PHYSICIAN ASSISTANT

## 2024-10-29 PROCEDURE — 6360000004 HC RX CONTRAST MEDICATION: Performed by: PHYSICIAN ASSISTANT

## 2024-10-29 PROCEDURE — 72157 MRI CHEST SPINE W/O & W/DYE: CPT

## 2024-10-29 RX ADMIN — GADOTERIDOL 14 ML: 279.3 INJECTION, SOLUTION INTRAVENOUS at 16:10

## 2024-11-04 DIAGNOSIS — M51.24 HERNIATION OF INTERVERTEBRAL DISC OF THORACIC REGION: Primary | ICD-10-CM

## 2024-11-04 DIAGNOSIS — M54.6 RIGHT-SIDED THORACIC BACK PAIN, UNSPECIFIED CHRONICITY: ICD-10-CM

## 2024-11-04 PROBLEM — M47.814 THORACIC ARTHRITIS: Status: ACTIVE | Noted: 2024-11-04

## 2024-11-21 DIAGNOSIS — E29.1 TESTICULAR HYPOFUNCTION: ICD-10-CM

## 2024-11-21 RX ORDER — TESTOSTERONE CYPIONATE 1000 MG/10ML
100 INJECTION, SOLUTION INTRAMUSCULAR
Qty: 6 ML | Refills: 3 | Status: SHIPPED | OUTPATIENT
Start: 2024-11-21 | End: 2025-11-21

## 2024-11-21 RX ORDER — NEEDLES, SAFETY 25GX1 1/2"
NEEDLE, DISPOSABLE MISCELLANEOUS
Qty: 50 EACH | Refills: 3 | Status: SHIPPED | OUTPATIENT
Start: 2024-11-21

## 2024-12-20 NOTE — PROGRESS NOTES
non-distended, positive bowel sounds, no organomegaly, no palpable masses, no guarding, no rebound tenderness  : Circumcised phallus without abnormality, R testicle surgically absent.  L testicle descended in scrotum and without palpable mass/nodule, non-tender, no edema, no skin erythema, vas deferens palpable, no hydrocele, no inguinal hernias, no inguinal lymphadenopathy  SKIN: No rash, no erythema, no lacerations or abrasions, no ecchymosis  NEUROLOGIC: cranial nerves 2-12 grossly intact       Assessment and Plan    ICD-10-CM    1. Malignant neoplasm of undescended right testis (HCC)  C62.01 AMB POC URINALYSIS DIP STICK AUTO W/O MICRO     Lactate Dehydrogenase     HCG, Quantitative, Pregnancy     AFP Tumor Marker     CT CHEST W CONTRAST     CT ABDOMEN PELVIS W IV CONTRAST Additional Contrast? None      2. Testicular hypofunction  E29.1 AMB POC URINALYSIS DIP STICK AUTO W/O MICRO     Testosterone, Free     CBC     Hepatic Function Panel     Testosterone Total Only, Male      3. Benign prostatic hyperplasia with lower urinary tract symptoms, symptom details unspecified  N40.1 AMB POC URINALYSIS DIP STICK AUTO W/O MICRO     AMB POC PVR, KIMBERLYN,POST-VOID RES,US,NON-IMAGING     PSA, Diagnostic      4. Erectile dysfunction due to arterial insufficiency  N52.01 AMB POC URINALYSIS DIP STICK AUTO W/O MICRO        History of R Testicle Cancer:   Will get tumor markers and order CT C/A/P today for surveillance.  Will call with results when available.     Low T:   Due for T labs today.  Also will restart  mg q2 weeks.  Script sent today.     BPH:   Continue flomax.  Working well.  Finasteride did not help symptoms.  Will stay off    ED:   Continue viagra PRN.  Working well.     I have spent 43 minutes today reviewing previous notes, test results and face to face with the patient as well as documenting.      Toño Porras M.D.    [facility]  [facility]  [address] 
No

## 2025-01-02 DIAGNOSIS — R39.14 BENIGN PROSTATIC HYPERPLASIA WITH INCOMPLETE BLADDER EMPTYING: ICD-10-CM

## 2025-01-02 DIAGNOSIS — N40.1 BENIGN PROSTATIC HYPERPLASIA WITH INCOMPLETE BLADDER EMPTYING: ICD-10-CM

## 2025-01-02 RX ORDER — TAMSULOSIN HYDROCHLORIDE 0.4 MG/1
CAPSULE ORAL
Qty: 90 CAPSULE | Refills: 3 | Status: SHIPPED | OUTPATIENT
Start: 2025-01-02

## 2025-01-14 ENCOUNTER — OFFICE VISIT (OUTPATIENT)
Dept: FAMILY MEDICINE CLINIC | Facility: CLINIC | Age: 55
End: 2025-01-14
Payer: COMMERCIAL

## 2025-01-14 VITALS
DIASTOLIC BLOOD PRESSURE: 78 MMHG | RESPIRATION RATE: 16 BRPM | OXYGEN SATURATION: 98 % | SYSTOLIC BLOOD PRESSURE: 118 MMHG | WEIGHT: 158 LBS | BODY MASS INDEX: 23.95 KG/M2 | HEART RATE: 78 BPM | TEMPERATURE: 98 F | HEIGHT: 68 IN

## 2025-01-14 DIAGNOSIS — Z00.00 PHYSICAL EXAM, ANNUAL: Primary | ICD-10-CM

## 2025-01-14 DIAGNOSIS — M54.12 CERVICAL RADICULAR PAIN: ICD-10-CM

## 2025-01-14 DIAGNOSIS — S61.209A OPEN WOUND OF FINGER, INITIAL ENCOUNTER: ICD-10-CM

## 2025-01-14 DIAGNOSIS — G54.2 CERVICAL NERVE ROOT IMPINGEMENT: ICD-10-CM

## 2025-01-14 DIAGNOSIS — F33.40 RECURRENT MAJOR DEPRESSIVE DISORDER, IN REMISSION (HCC): ICD-10-CM

## 2025-01-14 DIAGNOSIS — N52.9 ERECTILE DYSFUNCTION, UNSPECIFIED ERECTILE DYSFUNCTION TYPE: ICD-10-CM

## 2025-01-14 DIAGNOSIS — K21.9 GASTROESOPHAGEAL REFLUX DISEASE WITHOUT ESOPHAGITIS: ICD-10-CM

## 2025-01-14 DIAGNOSIS — F40.240 CLAUSTROPHOBIA: ICD-10-CM

## 2025-01-14 DIAGNOSIS — G43.009 MIGRAINE WITHOUT AURA AND WITHOUT STATUS MIGRAINOSUS, NOT INTRACTABLE: ICD-10-CM

## 2025-01-14 PROCEDURE — 99396 PREV VISIT EST AGE 40-64: CPT | Performed by: PHYSICIAN ASSISTANT

## 2025-01-14 PROCEDURE — 99214 OFFICE O/P EST MOD 30 MIN: CPT | Performed by: PHYSICIAN ASSISTANT

## 2025-01-14 RX ORDER — SUMATRIPTAN SUCCINATE 100 MG/1
TABLET ORAL
Qty: 27 TABLET | Refills: 3 | Status: SHIPPED | OUTPATIENT
Start: 2025-01-14

## 2025-01-14 RX ORDER — ALPRAZOLAM 0.5 MG
TABLET ORAL
Qty: 1 TABLET | Refills: 0 | Status: SHIPPED | OUTPATIENT
Start: 2025-01-14 | End: 2025-04-14

## 2025-01-14 RX ORDER — LANSOPRAZOLE 30 MG/1
30 CAPSULE, DELAYED RELEASE ORAL EVERY OTHER DAY
Qty: 90 CAPSULE | Refills: 3 | Status: SHIPPED | OUTPATIENT
Start: 2025-01-14

## 2025-01-14 RX ORDER — MUPIROCIN 20 MG/G
OINTMENT TOPICAL
Qty: 15 G | Refills: 1 | Status: SHIPPED | OUTPATIENT
Start: 2025-01-14

## 2025-01-14 RX ORDER — SILDENAFIL 100 MG/1
100 TABLET, FILM COATED ORAL DAILY PRN
Qty: 30 TABLET | Refills: 3 | Status: SHIPPED | OUTPATIENT
Start: 2025-01-14

## 2025-01-14 SDOH — ECONOMIC STABILITY: FOOD INSECURITY: WITHIN THE PAST 12 MONTHS, THE FOOD YOU BOUGHT JUST DIDN'T LAST AND YOU DIDN'T HAVE MONEY TO GET MORE.: NEVER TRUE

## 2025-01-14 SDOH — ECONOMIC STABILITY: FOOD INSECURITY: WITHIN THE PAST 12 MONTHS, YOU WORRIED THAT YOUR FOOD WOULD RUN OUT BEFORE YOU GOT MONEY TO BUY MORE.: NEVER TRUE

## 2025-01-14 ASSESSMENT — ENCOUNTER SYMPTOMS
SORE THROAT: 0
WHEEZING: 0
CONSTIPATION: 0
COUGH: 0
ABDOMINAL PAIN: 0
CHEST TIGHTNESS: 0
EYE DISCHARGE: 0
RHINORRHEA: 0
EYE ITCHING: 0
DIARRHEA: 0
EYE PAIN: 0

## 2025-01-14 ASSESSMENT — PATIENT HEALTH QUESTIONNAIRE - PHQ9
9. THOUGHTS THAT YOU WOULD BE BETTER OFF DEAD, OR OF HURTING YOURSELF: NOT AT ALL
SUM OF ALL RESPONSES TO PHQ9 QUESTIONS 1 & 2: 0
1. LITTLE INTEREST OR PLEASURE IN DOING THINGS: NOT AT ALL
6. FEELING BAD ABOUT YOURSELF - OR THAT YOU ARE A FAILURE OR HAVE LET YOURSELF OR YOUR FAMILY DOWN: NOT AT ALL
5. POOR APPETITE OR OVEREATING: NOT AT ALL
SUM OF ALL RESPONSES TO PHQ QUESTIONS 1-9: 0
10. IF YOU CHECKED OFF ANY PROBLEMS, HOW DIFFICULT HAVE THESE PROBLEMS MADE IT FOR YOU TO DO YOUR WORK, TAKE CARE OF THINGS AT HOME, OR GET ALONG WITH OTHER PEOPLE: NOT DIFFICULT AT ALL
7. TROUBLE CONCENTRATING ON THINGS, SUCH AS READING THE NEWSPAPER OR WATCHING TELEVISION: NOT AT ALL
SUM OF ALL RESPONSES TO PHQ QUESTIONS 1-9: 0
4. FEELING TIRED OR HAVING LITTLE ENERGY: NOT AT ALL
SUM OF ALL RESPONSES TO PHQ QUESTIONS 1-9: 0
SUM OF ALL RESPONSES TO PHQ QUESTIONS 1-9: 0
2. FEELING DOWN, DEPRESSED OR HOPELESS: NOT AT ALL
3. TROUBLE FALLING OR STAYING ASLEEP: NOT AT ALL
8. MOVING OR SPEAKING SO SLOWLY THAT OTHER PEOPLE COULD HAVE NOTICED. OR THE OPPOSITE, BEING SO FIGETY OR RESTLESS THAT YOU HAVE BEEN MOVING AROUND A LOT MORE THAN USUAL: NOT AT ALL

## 2025-01-14 NOTE — PROGRESS NOTES
Will send patient to lab today for routine blood work.    Migraines have been well-controlled and he has only been needing Imitrex about once or twice monthly. ***      He continues to take Prevacid for treatment of GERD symptoms.     Followed by urology for BPH, h/o rt. Testicular CA  Followed by [facility] for Anxiety/insomnia.   Followed by [facility] for Left hip pain, Femoroacetabular impingement of left hip       At his visit On 9/26/24, the following was discussed:     Patient now has a recurrence of pain in his right thoracic back area along with some numbness.  He is currently taking Aleve for symptoms pain is 6 out of 10 at its worst.  He reports that he received 3 injections in his spine through [facility] pain management which helped him 5 years ago.  Will obtain an updated MRI of his lumbar spine and I am prescribing tramadol twice daily as needed for back pain.  He denies any urinary or fecal incontinence.  Will consider referral back to [facility] for injections pending MRI results.  He also states that he has claustrophobia so we will prescribe Xanax 0.5 mg to take 30 minutes prior to the MRI.  --10/16/2024: Sent in an additional 30-day supply of tramadol for acute right-sided thoracic back pain.  MRI to be done end of the month.  --11/4/2024: Referral placed to [facility] pain management after review of MRI of lumbar spine. ***Need records    At his visit On ***, the following was discussed:   
lot more than usual 0 0    Thoughts that you would be better off dead, or of hurting yourself in some way 0 0    PHQ-2 Score 0 0 0   PHQ-9 Total Score 0 0 0   If you checked off any problems, how difficult have these problems made it for you to do your work, take care of things at home, or get along with other people? 0            Orders Placed This Encounter    MRI CERVICAL SPINE W WO CONTRAST     Standing Status:   Future     Standing Expiration Date:   1/14/2026     Order Specific Question:   STAT Creatinine as needed:     Answer:   Yes     Order Specific Question:   Reason for exam:     Answer:   C4 impingement w/ radicular symptoms, h/o bone spur noted on MRI ~8 years ago.     Order Specific Question:   What is the sedation requirement?     Answer:   Sedation     Comments:   pt provided w/ an rx for Xanax prior to imaging    sildenafil (VIAGRA) 100 MG tablet     Sig: Take 1 tablet by mouth daily as needed for Erectile Dysfunction     Dispense:  30 tablet     Refill:  3    lansoprazole (PREVACID) 30 MG delayed release capsule     Sig: Take 1 capsule by mouth every other day TAKE 1 CAPSULE BY MOUTH EVERY OTHER DAY     Dispense:  90 capsule     Refill:  3    SUMAtriptan (IMITREX) 100 MG tablet     Sig: Take one prn migraine. May repeat in two hours if needed. May take with two Advil Liquigels. Max daily dose= 2 tabs     Dispense:  27 tablet     Refill:  3     Please place rx on file until pt. Requests refill.    mupirocin (BACTROBAN) 2 % ointment     Sig: Apply topically 3 times daily.     Dispense:  15 g     Refill:  1    ALPRAZolam (XANAX) 0.5 MG tablet     Sig: Take 1 tab 30 min prior to MRI     Dispense:  1 tablet     Refill:  0        Part of this note was written by using a voice dictation software. The note has been proof read but may still contain some grammatical/other typographical errors.     An electronic signature was used to authenticate this note.     DAMI Carney

## 2025-01-16 ENCOUNTER — TELEPHONE (OUTPATIENT)
Dept: UROLOGY | Age: 55
End: 2025-01-16

## 2025-02-13 ENCOUNTER — PROCEDURE VISIT (OUTPATIENT)
Dept: UROLOGY | Age: 55
End: 2025-02-13

## 2025-02-13 DIAGNOSIS — R39.14 BENIGN PROSTATIC HYPERPLASIA WITH INCOMPLETE BLADDER EMPTYING: Primary | ICD-10-CM

## 2025-02-13 DIAGNOSIS — N40.1 BENIGN PROSTATIC HYPERPLASIA WITH INCOMPLETE BLADDER EMPTYING: Primary | ICD-10-CM

## 2025-02-13 DIAGNOSIS — R39.9 LOWER URINARY TRACT SYMPTOMS (LUTS): ICD-10-CM

## 2025-02-13 DIAGNOSIS — Z85.47 HX OF TESTICULAR CANCER: ICD-10-CM

## 2025-02-13 DIAGNOSIS — E29.1 HYPOGONADISM IN MALE: ICD-10-CM

## 2025-02-13 DIAGNOSIS — N52.01 ERECTILE DYSFUNCTION DUE TO ARTERIAL INSUFFICIENCY: ICD-10-CM

## 2025-02-13 LAB
BILIRUBIN, URINE, POC: NEGATIVE
BLOOD URINE, POC: NEGATIVE
GLUCOSE URINE, POC: NEGATIVE MG/DL
KETONES, URINE, POC: NEGATIVE MG/DL
LEUKOCYTE ESTERASE, URINE, POC: NEGATIVE
NITRITE, URINE, POC: NEGATIVE
PH, URINE, POC: 7 (ref 4.6–8)
PROTEIN,URINE, POC: NEGATIVE MG/DL
SPECIFIC GRAVITY, URINE, POC: 1.01 (ref 1–1.03)
URINALYSIS CLARITY, POC: NORMAL
URINALYSIS COLOR, POC: NORMAL
UROBILINOGEN, POC: NORMAL MG/DL

## 2025-02-13 RX ORDER — FINASTERIDE 5 MG/1
5 TABLET, FILM COATED ORAL DAILY
Qty: 90 TABLET | Refills: 3 | Status: SHIPPED | OUTPATIENT
Start: 2025-02-13

## 2025-02-13 NOTE — PROGRESS NOTES
[facility]  [address]   [address]  [place] [address]  [phone]    Kaiser Miles  : 1970         HPI   54 y.o., male who presents for cystoscopy for evaluation of LUTS.     He has a PMH of R radical orchiectomy on 19.  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  No recurrence since surgery.  Imaging and cancer surveillance markers due 2025.     He also has had hypogonadism since his testicular cancer surgery and was on IM testosterone 100 mg every 2 weeks.  He was doing well on that and had no low T symptoms. T labs due 2025.     He remains on flomax but is now off of his finasteride for BPH and still on viagra for ED. States these stream is now worse and he changed to rapaflo 10/2024 from flomax.  Now on rapaflo + flomax BID which helps \"some.\"  Opts for cysto today for further evaluation.  Denies dizziness / side effects from rapaflo + flomax BID.      Past Medical History:   Diagnosis Date    Anemia     Cancer (HCC)     testicular    Chronic pain     right shoulder, back, both hips     Depression     Early awakening     woke up during right shoulder surgery    GERD (gastroesophageal reflux disease)     controlled with meds    Insomnia     Kidney stone     Migraine     Nicotine vapor product user      Past Surgical History:   Procedure Laterality Date    COLONOSCOPY N/A 4/15/2024    COLONOSCOPY POLYPECTOMY performed by Cherie Vallejo MD at [facility] ENDOSCOPY    COLONOSCOPY N/A 2024    COLONOSCOPY POLYPECTOMY SNARE BIOPSY performed by Cherie Valleoj MD at [facility] ENDOSCOPY    HERNIA REPAIR Right     KNEE ARTHROSCOPY Bilateral     ORTHOPEDIC SURGERY      artifical disc replacement at L5-S1    ORTHOPEDIC SURGERY      right hip impingement repair, microfracture, hip dysplasia    OTHER SURGICAL HISTORY Left     ankle    RADICAL ORCHIECTOMY Right 2019    SHOULDER ARTHROSCOPY Right 16    RIGHT SHOULDER

## 2025-02-19 ENCOUNTER — TELEPHONE (OUTPATIENT)
Dept: FAMILY MEDICINE CLINIC | Facility: CLINIC | Age: 55
End: 2025-02-19

## 2025-02-19 NOTE — TELEPHONE ENCOUNTER
[facility] with [facility] [facility] authorization called regarding patients MRI cervical spine. The patients insurance has denied the MRI and are requesting a peer to peer. Information listen below-    [facility]  [phone]  Case number [id number]    [facility] call back number is [phone], ext. [phone]

## 2025-02-19 NOTE — TELEPHONE ENCOUNTER
Left [facility] for Fam @ 3:16pm; will try calling back tomorrow for a Peer to peer review.

## 2025-02-19 NOTE — TELEPHONE ENCOUNTER
Left [facility] for Fam @ 12:40; will try calling back later for Peer to peer review.

## 2025-02-20 NOTE — TELEPHONE ENCOUNTER
3rd Call:   Left VM at 9:39 am.  Will try calling back later today to perform requested Peer to Peer review.

## 2025-02-25 DIAGNOSIS — M54.12 CERVICAL RADICULAR PAIN: ICD-10-CM

## 2025-02-25 DIAGNOSIS — G54.2 CERVICAL NERVE ROOT IMPINGEMENT: Primary | ICD-10-CM

## 2025-04-25 ENCOUNTER — LAB (OUTPATIENT)
Dept: UROLOGY | Age: 55
End: 2025-04-25

## 2025-04-25 ENCOUNTER — RESULTS FOLLOW-UP (OUTPATIENT)
Dept: UROLOGY | Age: 55
End: 2025-04-25

## 2025-04-25 DIAGNOSIS — N40.0 BENIGN PROSTATIC HYPERPLASIA WITHOUT LOWER URINARY TRACT SYMPTOMS: ICD-10-CM

## 2025-04-25 DIAGNOSIS — C62.10 MALIGNANT NEOPLASM OF DESCENDED TESTIS, UNSPECIFIED LATERALITY (HCC): ICD-10-CM

## 2025-04-25 DIAGNOSIS — E29.1 HYPOGONADISM IN MALE: ICD-10-CM

## 2025-04-25 LAB
ALBUMIN SERPL-MCNC: 3.9 G/DL (ref 3.5–5)
ALBUMIN/GLOB SERPL: 1.3 (ref 1–1.9)
ALP SERPL-CCNC: 113 U/L (ref 40–129)
ALT SERPL-CCNC: 57 U/L (ref 8–55)
AST SERPL-CCNC: 40 U/L (ref 15–37)
BILIRUB DIRECT SERPL-MCNC: 0.1 MG/DL (ref 0–0.3)
BILIRUB SERPL-MCNC: 0.2 MG/DL (ref 0–1.2)
ERYTHROCYTE [DISTWIDTH] IN BLOOD BY AUTOMATED COUNT: 20 % (ref 11.9–14.6)
GLOBULIN SER CALC-MCNC: 3.1 G/DL (ref 2.3–3.5)
HCG SERPL-ACNC: <1 MIU/ML
HCT VFR BLD AUTO: 41.3 % (ref 41.1–50.3)
HGB BLD-MCNC: 11.6 G/DL (ref 13.6–17.2)
LDH SERPL L TO P-CCNC: 183 U/L (ref 127–281)
MCH RBC QN AUTO: 20.2 PG (ref 26.1–32.9)
MCHC RBC AUTO-ENTMCNC: 28.1 G/DL (ref 31.4–35)
MCV RBC AUTO: 72.1 FL (ref 82–102)
NRBC # BLD: 0 K/UL (ref 0–0.2)
PLATELET # BLD AUTO: 386 K/UL (ref 150–450)
PMV BLD AUTO: 10.2 FL (ref 9.4–12.3)
PROT SERPL-MCNC: 7 G/DL (ref 6.3–8.2)
PSA SERPL-MCNC: 1.1 NG/ML (ref 0–4)
RBC # BLD AUTO: 5.73 M/UL (ref 4.23–5.6)
WBC # BLD AUTO: 8.3 K/UL (ref 4.3–11.1)

## 2025-04-27 LAB
TESTOST FREE SERPL-MCNC: 4.3 PG/ML (ref 7.2–24)
TESTOST SERPL-MCNC: 255 NG/DL (ref 264–916)

## 2025-04-28 LAB — AFP-TM SERPL-MCNC: 2.92 NG/ML (ref 0–8.3)

## 2025-05-21 ENCOUNTER — TELEPHONE (OUTPATIENT)
Dept: UROLOGY | Age: 55
End: 2025-05-21

## 2025-05-21 ENCOUNTER — OFFICE VISIT (OUTPATIENT)
Dept: UROLOGY | Age: 55
End: 2025-05-21

## 2025-05-21 DIAGNOSIS — N40.1 BENIGN PROSTATIC HYPERPLASIA WITH INCOMPLETE BLADDER EMPTYING: ICD-10-CM

## 2025-05-21 DIAGNOSIS — R39.14 BENIGN PROSTATIC HYPERPLASIA WITH INCOMPLETE BLADDER EMPTYING: ICD-10-CM

## 2025-05-21 DIAGNOSIS — N52.01 ERECTILE DYSFUNCTION DUE TO ARTERIAL INSUFFICIENCY: ICD-10-CM

## 2025-05-21 DIAGNOSIS — E29.1 HYPOGONADISM IN MALE: ICD-10-CM

## 2025-05-21 DIAGNOSIS — Z85.47 HX OF TESTICULAR CANCER: Primary | ICD-10-CM

## 2025-05-21 DIAGNOSIS — E29.1 TESTICULAR HYPOFUNCTION: ICD-10-CM

## 2025-05-21 LAB
BILIRUBIN, URINE, POC: NEGATIVE
BLOOD URINE, POC: NEGATIVE
GLUCOSE URINE, POC: NEGATIVE MG/DL
KETONES, URINE, POC: NEGATIVE MG/DL
LEUKOCYTE ESTERASE, URINE, POC: NEGATIVE
NITRITE, URINE, POC: NEGATIVE
PH, URINE, POC: 6.5 (ref 4.6–8)
PROTEIN,URINE, POC: NEGATIVE MG/DL
PVR, POC: 493 CC
SPECIFIC GRAVITY, URINE, POC: 1.01 (ref 1–1.03)
URINALYSIS CLARITY, POC: NORMAL
URINALYSIS COLOR, POC: NORMAL
UROBILINOGEN, POC: NORMAL MG/DL

## 2025-05-21 RX ORDER — TESTOSTERONE CYPIONATE 1000 MG/10ML
100 INJECTION, SOLUTION INTRAMUSCULAR
Qty: 12 ML | Refills: 3 | Status: SHIPPED | OUTPATIENT
Start: 2025-05-21 | End: 2026-05-21

## 2025-05-21 RX ORDER — NEEDLES, SAFETY 25GX1 1/2"
NEEDLE, DISPOSABLE MISCELLANEOUS
Qty: 50 EACH | Refills: 3 | Status: SHIPPED | OUTPATIENT
Start: 2025-05-21

## 2025-05-21 NOTE — TELEPHONE ENCOUNTER
----- Message from Dr. Toño Porras MD sent at 5/21/2025  8:48 AM EDT -----  Regarding: Surgery scheduling  Hospital: [facility]    Surgeon: Chiquita    Assist: NONE    Diagnosis: BPH    Procedure: Urolift    Posting time: 40 minutes    Special Instruments Needed:  NONE    Anesthesia: GENERAL    Labs: CBC, BMP, U/A    Tests: EKG per anesthesia    Blood: NONE    Bowel Prep: NONE    Special Instructions:     Orders/HandP:     Follow up appointment: 4 weeks post-op visit.

## 2025-06-04 ENCOUNTER — PREP FOR PROCEDURE (OUTPATIENT)
Dept: UROLOGY | Age: 55
End: 2025-06-04

## 2025-06-04 DIAGNOSIS — R35.0 BENIGN PROSTATIC HYPERPLASIA WITH URINARY FREQUENCY: Primary | ICD-10-CM

## 2025-06-04 DIAGNOSIS — N40.1 BENIGN PROSTATIC HYPERPLASIA WITH URINARY FREQUENCY: Primary | ICD-10-CM

## 2025-06-04 RX ORDER — SODIUM CHLORIDE 9 MG/ML
INJECTION, SOLUTION INTRAVENOUS PRN
Status: CANCELLED | OUTPATIENT
Start: 2025-06-04

## 2025-06-04 RX ORDER — SODIUM CHLORIDE 0.9 % (FLUSH) 0.9 %
5-40 SYRINGE (ML) INJECTION PRN
Status: CANCELLED | OUTPATIENT
Start: 2025-06-04

## 2025-06-04 RX ORDER — SODIUM CHLORIDE 0.9 % (FLUSH) 0.9 %
5-40 SYRINGE (ML) INJECTION EVERY 12 HOURS SCHEDULED
Status: CANCELLED | OUTPATIENT
Start: 2025-06-04

## 2025-06-05 ENCOUNTER — TELEPHONE (OUTPATIENT)
Dept: UROLOGY | Age: 55
End: 2025-06-05

## 2025-06-05 NOTE — TELEPHONE ENCOUNTER
----- Message from Dr. Toño Porras MD sent at 6/4/2025  7:46 PM EDT -----  I can do it without rep.  That is fine.  ----- Message -----  From: Nayeli Alvarenga  Sent: 6/4/2025   2:01 PM EDT  To: Toño Porras MD    Are you ok with no rep for the urolift for this patient.  Lázaro is out of town and the new rep wont be done training yet at that time.  I just hate to keep pushing it out.    Thanks,  Nayeli

## 2025-06-10 ENCOUNTER — PATIENT MESSAGE (OUTPATIENT)
Dept: UROLOGY | Age: 55
End: 2025-06-10

## 2025-06-10 DIAGNOSIS — E29.1 HYPOGONADISM IN MALE: Primary | ICD-10-CM

## 2025-06-10 RX ORDER — TESTOSTERONE CYPIONATE 200 MG/ML
VIAL (ML) INTRAMUSCULAR
Qty: 12 ML | Refills: 1 | Status: SHIPPED | OUTPATIENT
Start: 2025-06-10

## 2025-06-11 ENCOUNTER — TELEPHONE (OUTPATIENT)
Dept: UROLOGY | Age: 55
End: 2025-06-11

## 2025-06-23 RX ORDER — MAGNESIUM GLUCONATE 30 MG(550)
1 TABLET ORAL DAILY
COMMUNITY

## 2025-06-23 RX ORDER — FERROUS SULFATE 325(65) MG
325 TABLET ORAL
COMMUNITY

## 2025-06-23 NOTE — PERIOP NOTE
Patient verified name and .  Order for consent  was found in EHR and does match case posting; patient verifies procedure.   Type 1B surgery, phone assessment complete.  Orders were received.  Labs per surgeon: UA, UCX, T&S, CBC, EKG  Labs per anesthesia protocol: no additional     Patient answered medical/surgical history questions at their best of ability. All prior to admission medications documented in EPIC.  Patient instructed on the following:  >Pre-op Labs and EK25 at 0[address] [facility]  [address], [address], [address]  > Surgery Location: [address] [place] [address] Hospital Entrance   > Time of arrival for surgery: A nurse will call you by 5:00 pm the business day before your surgery      to tell you the time you should arrive. Your arrival time may be different than your surgery time. If there is no      answer; the nurse will leave you a voicemail. If you have not received a phone call and do not have a voicemail     by 5:00 pm the day before your surgery please call Main Pre-op: [phone].    > No food after midnight, patient may drink clear liquids up until 2 hours prior to arrival. No gum, candy, mints.   > Responsible adult must drive patient to the hospital, stay during surgery, and patient will need supervision 24 hours after anesthesia  > Use non moisturizing soap in shower the night before surgery and on the morning of surgery  > All piercings must be removed prior to arrival.    > Leave all valuables (money and jewelry) at home but bring insurance card and ID on day of surgery.   > You may be required to pay a deductible or co-pay on the day of your procedure. You can pre-pay by calling [phone] if your surgery is at the   [facility] or [phone] if your surgery is at the [facility].  > Do not wear make-up, nail polish, lotions, cologne, perfumes, powders, or oil on skin. Artificial nails are not permitted.     PLEASE CONTINUE

## 2025-06-25 ENCOUNTER — RESULTS FOLLOW-UP (OUTPATIENT)
Dept: UROLOGY | Age: 55
End: 2025-06-25

## 2025-06-25 ENCOUNTER — HOSPITAL ENCOUNTER (OUTPATIENT)
Dept: SURGERY | Age: 55
Discharge: HOME | End: 2025-06-28
Payer: COMMERCIAL

## 2025-06-25 DIAGNOSIS — R35.0 BENIGN PROSTATIC HYPERPLASIA WITH URINARY FREQUENCY: ICD-10-CM

## 2025-06-25 DIAGNOSIS — N40.1 BENIGN PROSTATIC HYPERPLASIA WITH INCOMPLETE BLADDER EMPTYING: ICD-10-CM

## 2025-06-25 DIAGNOSIS — N40.1 BENIGN PROSTATIC HYPERPLASIA WITH URINARY FREQUENCY: ICD-10-CM

## 2025-06-25 DIAGNOSIS — R39.14 BENIGN PROSTATIC HYPERPLASIA WITH INCOMPLETE BLADDER EMPTYING: ICD-10-CM

## 2025-06-25 LAB
APPEARANCE UR: CLEAR
BILIRUB UR QL: NEGATIVE
COLOR UR: NORMAL
EKG ATRIAL RATE: 83 BPM
EKG DIAGNOSIS: NORMAL
EKG P AXIS: 63 DEGREES
EKG P-R INTERVAL: 116 MS
EKG Q-T INTERVAL: 350 MS
EKG QRS DURATION: 98 MS
EKG QTC CALCULATION (BAZETT): 411 MS
EKG R AXIS: 41 DEGREES
EKG T AXIS: 49 DEGREES
EKG VENTRICULAR RATE: 83 BPM
ERYTHROCYTE [DISTWIDTH] IN BLOOD BY AUTOMATED COUNT: 21.2 % (ref 11.9–14.6)
GLUCOSE UR STRIP.AUTO-MCNC: NEGATIVE MG/DL
HCT VFR BLD AUTO: 44.5 % (ref 41.1–50.3)
HGB BLD-MCNC: 13.4 G/DL (ref 13.6–17.2)
HGB UR QL STRIP: NEGATIVE
KETONES UR QL STRIP.AUTO: NEGATIVE MG/DL
LEUKOCYTE ESTERASE UR QL STRIP.AUTO: NEGATIVE
MCH RBC QN AUTO: 22.9 PG (ref 26.1–32.9)
MCHC RBC AUTO-ENTMCNC: 30.1 G/DL (ref 31.4–35)
MCV RBC AUTO: 76.2 FL (ref 82–102)
NITRITE UR QL STRIP.AUTO: NEGATIVE
NRBC # BLD: 0 K/UL (ref 0–0.2)
PH UR STRIP: 6.5 (ref 5–9)
PLATELET # BLD AUTO: 348 K/UL (ref 150–450)
PMV BLD AUTO: 10 FL (ref 9.4–12.3)
PROT UR STRIP-MCNC: NEGATIVE MG/DL
RBC # BLD AUTO: 5.84 M/UL (ref 4.23–5.6)
SP GR UR REFRACTOMETRY: 1.01 (ref 1–1.02)
UROBILINOGEN UR QL STRIP.AUTO: 0.2 EU/DL (ref 0.2–1)
WBC # BLD AUTO: 7.4 K/UL (ref 4.3–11.1)

## 2025-06-25 PROCEDURE — 85027 COMPLETE CBC AUTOMATED: CPT

## 2025-06-25 PROCEDURE — 93010 ELECTROCARDIOGRAM REPORT: CPT | Performed by: INTERNAL MEDICINE

## 2025-06-25 PROCEDURE — 93005 ELECTROCARDIOGRAM TRACING: CPT

## 2025-06-25 PROCEDURE — 87086 URINE CULTURE/COLONY COUNT: CPT

## 2025-06-25 PROCEDURE — 81003 URINALYSIS AUTO W/O SCOPE: CPT

## 2025-06-25 RX ORDER — TAMSULOSIN HYDROCHLORIDE 0.4 MG/1
CAPSULE ORAL
Qty: 90 CAPSULE | Refills: 3 | Status: SHIPPED | OUTPATIENT
Start: 2025-06-25

## 2025-06-27 LAB
BACTERIA SPEC CULT: NORMAL
SERVICE CMNT-IMP: NORMAL

## 2025-07-01 ENCOUNTER — HOSPITAL ENCOUNTER (OUTPATIENT)
Age: 55
Setting detail: OUTPATIENT SURGERY
Discharge: HOME | End: 2025-07-01
Attending: UROLOGY | Admitting: UROLOGY
Payer: COMMERCIAL

## 2025-07-01 ENCOUNTER — ANESTHESIA EVENT (OUTPATIENT)
Dept: SURGERY | Age: 55
End: 2025-07-01
Payer: COMMERCIAL

## 2025-07-01 ENCOUNTER — ANESTHESIA (OUTPATIENT)
Dept: SURGERY | Age: 55
End: 2025-07-01
Payer: COMMERCIAL

## 2025-07-01 VITALS
OXYGEN SATURATION: 99 % | SYSTOLIC BLOOD PRESSURE: 145 MMHG | HEART RATE: 71 BPM | RESPIRATION RATE: 18 BRPM | WEIGHT: 164.2 LBS | DIASTOLIC BLOOD PRESSURE: 86 MMHG | TEMPERATURE: 97.9 F | BODY MASS INDEX: 24.89 KG/M2 | HEIGHT: 68 IN

## 2025-07-01 DIAGNOSIS — N40.1 HYPERPLASIA OF PROSTATE WITH LOWER URINARY TRACT SYMPTOMS (LUTS): Primary | ICD-10-CM

## 2025-07-01 PROCEDURE — 6360000002 HC RX W HCPCS

## 2025-07-01 PROCEDURE — 2709999900 HC NON-CHARGEABLE SUPPLY: Performed by: UROLOGY

## 2025-07-01 PROCEDURE — 3700000001 HC ADD 15 MINUTES (ANESTHESIA): Performed by: UROLOGY

## 2025-07-01 PROCEDURE — 7100000001 HC PACU RECOVERY - ADDTL 15 MIN: Performed by: UROLOGY

## 2025-07-01 PROCEDURE — 6370000000 HC RX 637 (ALT 250 FOR IP): Performed by: ANESTHESIOLOGY

## 2025-07-01 PROCEDURE — C1889 IMPLANT/INSERT DEVICE, NOC: HCPCS | Performed by: UROLOGY

## 2025-07-01 PROCEDURE — 52441 CYSTO INSJ TRNSPRSTC 1 IMPLT: CPT | Performed by: UROLOGY

## 2025-07-01 PROCEDURE — 2580000003 HC RX 258: Performed by: ANESTHESIOLOGY

## 2025-07-01 PROCEDURE — 7100000010 HC PHASE II RECOVERY - FIRST 15 MIN: Performed by: UROLOGY

## 2025-07-01 PROCEDURE — 52442 CYSTO INS TRNSPRSTC IMPLT EA: CPT | Performed by: UROLOGY

## 2025-07-01 PROCEDURE — 3600000013 HC SURGERY LEVEL 3 ADDTL 15MIN: Performed by: UROLOGY

## 2025-07-01 PROCEDURE — 7100000000 HC PACU RECOVERY - FIRST 15 MIN: Performed by: UROLOGY

## 2025-07-01 PROCEDURE — 7100000011 HC PHASE II RECOVERY - ADDTL 15 MIN: Performed by: UROLOGY

## 2025-07-01 PROCEDURE — 3600000003 HC SURGERY LEVEL 3 BASE: Performed by: UROLOGY

## 2025-07-01 PROCEDURE — 3700000000 HC ANESTHESIA ATTENDED CARE: Performed by: UROLOGY

## 2025-07-01 PROCEDURE — 6360000002 HC RX W HCPCS: Performed by: UROLOGY

## 2025-07-01 DEVICE — DEVICE IMPLANT UROLIFT2 FOR TREAT OF URIN OUTFLO: Type: IMPLANTABLE DEVICE | Status: FUNCTIONAL

## 2025-07-01 DEVICE — SYSTEM UROLIFT2 W/ IMPL DEL DEV FOR TREAT OF URIN OUTFLO: Type: IMPLANTABLE DEVICE | Status: FUNCTIONAL

## 2025-07-01 RX ORDER — SODIUM CHLORIDE, SODIUM LACTATE, POTASSIUM CHLORIDE, CALCIUM CHLORIDE 600; 310; 30; 20 MG/100ML; MG/100ML; MG/100ML; MG/100ML
INJECTION, SOLUTION INTRAVENOUS CONTINUOUS
Status: DISCONTINUED | OUTPATIENT
Start: 2025-07-01 | End: 2025-07-01 | Stop reason: HOSPADM

## 2025-07-01 RX ORDER — DEXAMETHASONE SODIUM PHOSPHATE 4 MG/ML
INJECTION, SOLUTION INTRA-ARTICULAR; INTRALESIONAL; INTRAMUSCULAR; INTRAVENOUS; SOFT TISSUE
Status: DISCONTINUED | OUTPATIENT
Start: 2025-07-01 | End: 2025-07-01 | Stop reason: SDUPTHER

## 2025-07-01 RX ORDER — OXYCODONE HYDROCHLORIDE 5 MG/1
10 TABLET ORAL PRN
Status: COMPLETED | OUTPATIENT
Start: 2025-07-01 | End: 2025-07-01

## 2025-07-01 RX ORDER — FAMOTIDINE 20 MG/1
20 TABLET, FILM COATED ORAL ONCE
Status: DISCONTINUED | OUTPATIENT
Start: 2025-07-01 | End: 2025-07-01 | Stop reason: HOSPADM

## 2025-07-01 RX ORDER — FENTANYL CITRATE 50 UG/ML
100 INJECTION, SOLUTION INTRAMUSCULAR; INTRAVENOUS
Status: DISCONTINUED | OUTPATIENT
Start: 2025-07-01 | End: 2025-07-01 | Stop reason: HOSPADM

## 2025-07-01 RX ORDER — ACETAMINOPHEN 500 MG
1000 TABLET ORAL ONCE
Status: COMPLETED | OUTPATIENT
Start: 2025-07-01 | End: 2025-07-01

## 2025-07-01 RX ORDER — SODIUM CHLORIDE 9 MG/ML
INJECTION, SOLUTION INTRAVENOUS PRN
Status: DISCONTINUED | OUTPATIENT
Start: 2025-07-01 | End: 2025-07-01 | Stop reason: HOSPADM

## 2025-07-01 RX ORDER — FENTANYL CITRATE 50 UG/ML
INJECTION, SOLUTION INTRAMUSCULAR; INTRAVENOUS
Status: DISCONTINUED | OUTPATIENT
Start: 2025-07-01 | End: 2025-07-01 | Stop reason: SDUPTHER

## 2025-07-01 RX ORDER — OXYCODONE HYDROCHLORIDE 5 MG/1
5 TABLET ORAL PRN
Status: COMPLETED | OUTPATIENT
Start: 2025-07-01 | End: 2025-07-01

## 2025-07-01 RX ORDER — CEPHALEXIN 500 MG/1
500 CAPSULE ORAL 2 TIMES DAILY
Qty: 10 CAPSULE | Refills: 0 | Status: SHIPPED | OUTPATIENT
Start: 2025-07-01 | End: 2025-07-06

## 2025-07-01 RX ORDER — DIPHENHYDRAMINE HYDROCHLORIDE 50 MG/ML
12.5 INJECTION, SOLUTION INTRAMUSCULAR; INTRAVENOUS
Status: DISCONTINUED | OUTPATIENT
Start: 2025-07-01 | End: 2025-07-01 | Stop reason: HOSPADM

## 2025-07-01 RX ORDER — PHENYLEPHRINE HYDROCHLORIDE 10 MG/ML
INJECTION INTRAVENOUS
Status: DISCONTINUED | OUTPATIENT
Start: 2025-07-01 | End: 2025-07-01 | Stop reason: SDUPTHER

## 2025-07-01 RX ORDER — SODIUM CHLORIDE 0.9 % (FLUSH) 0.9 %
5-40 SYRINGE (ML) INJECTION EVERY 12 HOURS SCHEDULED
Status: DISCONTINUED | OUTPATIENT
Start: 2025-07-01 | End: 2025-07-01 | Stop reason: HOSPADM

## 2025-07-01 RX ORDER — SODIUM CHLORIDE 0.9 % (FLUSH) 0.9 %
5-40 SYRINGE (ML) INJECTION PRN
Status: DISCONTINUED | OUTPATIENT
Start: 2025-07-01 | End: 2025-07-01 | Stop reason: HOSPADM

## 2025-07-01 RX ORDER — OXYCODONE AND ACETAMINOPHEN 5; 325 MG/1; MG/1
1 TABLET ORAL EVERY 6 HOURS PRN
Qty: 20 TABLET | Refills: 0 | Status: SHIPPED | OUTPATIENT
Start: 2025-07-01 | End: 2025-07-06

## 2025-07-01 RX ORDER — NALOXONE HYDROCHLORIDE 0.4 MG/ML
INJECTION, SOLUTION INTRAMUSCULAR; INTRAVENOUS; SUBCUTANEOUS PRN
Status: DISCONTINUED | OUTPATIENT
Start: 2025-07-01 | End: 2025-07-01 | Stop reason: HOSPADM

## 2025-07-01 RX ORDER — SODIUM CHLORIDE 9 MG/ML
INJECTION, SOLUTION INTRAVENOUS CONTINUOUS
Status: DISCONTINUED | OUTPATIENT
Start: 2025-07-01 | End: 2025-07-01 | Stop reason: HOSPADM

## 2025-07-01 RX ORDER — MIDAZOLAM HYDROCHLORIDE 2 MG/2ML
2 INJECTION, SOLUTION INTRAMUSCULAR; INTRAVENOUS
Status: DISCONTINUED | OUTPATIENT
Start: 2025-07-01 | End: 2025-07-01 | Stop reason: HOSPADM

## 2025-07-01 RX ORDER — LIDOCAINE HYDROCHLORIDE 10 MG/ML
1 INJECTION, SOLUTION INFILTRATION; PERINEURAL
Status: DISCONTINUED | OUTPATIENT
Start: 2025-07-01 | End: 2025-07-01 | Stop reason: HOSPADM

## 2025-07-01 RX ORDER — ONDANSETRON 2 MG/ML
INJECTION INTRAMUSCULAR; INTRAVENOUS
Status: DISCONTINUED | OUTPATIENT
Start: 2025-07-01 | End: 2025-07-01 | Stop reason: SDUPTHER

## 2025-07-01 RX ORDER — LIDOCAINE HYDROCHLORIDE 20 MG/ML
INJECTION, SOLUTION EPIDURAL; INFILTRATION; INTRACAUDAL; PERINEURAL
Status: DISCONTINUED | OUTPATIENT
Start: 2025-07-01 | End: 2025-07-01 | Stop reason: SDUPTHER

## 2025-07-01 RX ORDER — HYOSCYAMINE SULFATE 0.12 MG/1
0.12 TABLET SUBLINGUAL EVERY 4 HOURS PRN
Qty: 30 TABLET | Refills: 1 | Status: SHIPPED | OUTPATIENT
Start: 2025-07-01

## 2025-07-01 RX ORDER — PHENAZOPYRIDINE HYDROCHLORIDE 200 MG/1
200 TABLET, FILM COATED ORAL 3 TIMES DAILY PRN
Qty: 18 TABLET | Refills: 0 | Status: SHIPPED | OUTPATIENT
Start: 2025-07-01 | End: 2025-07-07

## 2025-07-01 RX ORDER — MIDAZOLAM HYDROCHLORIDE 2 MG/2ML
INJECTION, SOLUTION INTRAMUSCULAR; INTRAVENOUS
Status: DISCONTINUED | OUTPATIENT
Start: 2025-07-01 | End: 2025-07-01 | Stop reason: SDUPTHER

## 2025-07-01 RX ORDER — ONDANSETRON 2 MG/ML
4 INJECTION INTRAMUSCULAR; INTRAVENOUS
Status: DISCONTINUED | OUTPATIENT
Start: 2025-07-01 | End: 2025-07-01 | Stop reason: HOSPADM

## 2025-07-01 RX ORDER — PROPOFOL 10 MG/ML
INJECTION, EMULSION INTRAVENOUS
Status: DISCONTINUED | OUTPATIENT
Start: 2025-07-01 | End: 2025-07-01 | Stop reason: SDUPTHER

## 2025-07-01 RX ADMIN — Medication 2000 MG: at 15:11

## 2025-07-01 RX ADMIN — ACETAMINOPHEN 1000 MG: 500 TABLET, FILM COATED ORAL at 13:29

## 2025-07-01 RX ADMIN — PROPOFOL 200 MG: 10 INJECTION, EMULSION INTRAVENOUS at 15:07

## 2025-07-01 RX ADMIN — DEXAMETHASONE SODIUM PHOSPHATE 4 MG: 4 INJECTION INTRA-ARTICULAR; INTRALESIONAL; INTRAMUSCULAR; INTRAVENOUS; SOFT TISSUE at 15:15

## 2025-07-01 RX ADMIN — PHENYLEPHRINE HYDROCHLORIDE 100 MCG: 10 INJECTION INTRAVENOUS at 15:31

## 2025-07-01 RX ADMIN — SODIUM CHLORIDE, SODIUM LACTATE, POTASSIUM CHLORIDE, AND CALCIUM CHLORIDE: .6; .31; .03; .02 INJECTION, SOLUTION INTRAVENOUS at 13:26

## 2025-07-01 RX ADMIN — FENTANYL CITRATE 50 MCG: 50 INJECTION, SOLUTION INTRAMUSCULAR; INTRAVENOUS at 15:35

## 2025-07-01 RX ADMIN — ONDANSETRON 4 MG: 2 INJECTION, SOLUTION INTRAMUSCULAR; INTRAVENOUS at 15:15

## 2025-07-01 RX ADMIN — MIDAZOLAM HYDROCHLORIDE 2 MG: 1 INJECTION, SOLUTION INTRAMUSCULAR; INTRAVENOUS at 14:56

## 2025-07-01 RX ADMIN — FENTANYL CITRATE 50 MCG: 50 INJECTION, SOLUTION INTRAMUSCULAR; INTRAVENOUS at 15:09

## 2025-07-01 RX ADMIN — OXYCODONE 5 MG: 5 TABLET ORAL at 16:52

## 2025-07-01 RX ADMIN — LIDOCAINE HYDROCHLORIDE 100 MG: 20 INJECTION, SOLUTION EPIDURAL; INFILTRATION; INTRACAUDAL; PERINEURAL at 15:07

## 2025-07-01 ASSESSMENT — PAIN DESCRIPTION - DESCRIPTORS
DESCRIPTORS: ACHING
DESCRIPTORS: SORE
DESCRIPTORS: BURNING;DISCOMFORT

## 2025-07-01 ASSESSMENT — PAIN - FUNCTIONAL ASSESSMENT
PAIN_FUNCTIONAL_ASSESSMENT: 0-10
PAIN_FUNCTIONAL_ASSESSMENT: ACTIVITIES ARE NOT PREVENTED
PAIN_FUNCTIONAL_ASSESSMENT: 0-10

## 2025-07-01 ASSESSMENT — PAIN SCALES - GENERAL: PAINLEVEL_OUTOF10: 4

## 2025-07-01 ASSESSMENT — PAIN DESCRIPTION - LOCATION: LOCATION: PENIS

## 2025-07-01 ASSESSMENT — PAIN DESCRIPTION - ORIENTATION: ORIENTATION: MID

## 2025-07-01 NOTE — OP NOTE
[facility] OPERATIVE REPORT     Name:  Kaiser Miles  MR#:  [id number]  :   1970    DATE OF SERVICE:  2025     PREOPERATIVE DIAGNOSIS:  Benign prostatic hyperplasia/lower urinary tract symptoms.     POSTOPERATIVE DIAGNOSIS:  Benign prostatic hyperplasia/lower urinary tract symptoms.     PROCEDURE PERFORMED:  UroLift.     SURGEON:  Toño Porras MD     ASSISTANT: None     ANESTHESIA:  General.     COMPLICATIONS:  None.     SPECIMENS REMOVED:  None.     FINDINGS:  Lateral lobe hyperplasia bilaterally, No significant median lobe, Deployment of 7 UroLift implants with an open anterior channel at the end of the case.     IMPLANTS:  UroLift     ESTIMATED BLOOD LOSS:  1 mL.     INDICATIONS FOR OPERATIVE PROCEDURE:  The patient is a 55 year old gentleman with symptomatic BPH/LUTS refractory to medications who opted for UroLift procedure today.  Preoperative cystoscopy revealed no significant median lobe, but did show lateral lobe hyperplasia.  He was counseled extensively on management options and risks/benefits and opted to proceed today with Urolift.       DESCRIPTION OF OPERATIVE PROCEDURE:  After informed consent was obtained, the correct patient was identified in preoperative holding area, he was taken back to operating suite and placed on the table in the supine position.  Time-out was performed confirming the correct patient and planned procedure.  He received 2 g of IV Ancef prior to smooth induction of general endotracheal anesthesia.  He was then moved to the dorsal lithotomy position, prepped and draped in usual sterile fashion.  I began the case by inserting a 20-Swazi rigid urolift cystoscope with the visual obturator into the urethra and advanced it into the patient's bladder..  Pancystoscopy was performed which was unremarkable.  There was no significant median lobe and but significant lateral lobe bilateral hypertrophy.  I then removed the cystoscope back to the

## 2025-07-01 NOTE — DISCHARGE INSTRUCTIONS
After general anesthesia or intravenous sedation, for 24 hours or while taking prescription Narcotics:  Limit your activities  Some people will feel drowsy or dizzy for up to a few hours after waking up.  A responsible adult needs to be with you for the next 24 hours  Do not drive and operate hazardous machinery  Do not make important personal or business decisions  Do not drink alcoholic beverages  If you have not urinated within 8 hours after discharge, and you are experiencing discomfort from urinary retention, please go to the nearest ED.  If you have sleep apnea and have a CPAP machine, please use it for all naps and sleeping.  Please use caution when taking narcotics and any of your home medications that may cause drowsiness.  *  Please give a list of your current medications to your Primary Care Provider.  *  Please update this list whenever your medications are discontinued, doses are      changed, or new medications (including over-the-counter products) are added.  *  Please carry medication information at all times in case of emergency situations.    These are general instructions for a healthy lifestyle:  No smoking/ No tobacco products/ Avoid exposure to second hand smoke  Surgeon General's Warning:  Quitting smoking now greatly reduces serious risk to your health.  Obesity, smoking, and sedentary lifestyle greatly increases your risk for illness  A healthy diet, regular physical exercise & weight monitoring are important for maintaining a healthy lifestyle    You may be retaining fluid if you have a history of heart failure or if you experience any of the following symptoms:  Weight gain of 3 pounds or more overnight or 5 pounds in a week, increased swelling in our hands or feet or shortness of breath while lying flat in bed.  Please call your doctor as soon as you notice any of these symptoms; do not wait until your next office visit.

## 2025-07-01 NOTE — ANESTHESIA PRE PROCEDURE
Department of Anesthesiology  Preprocedure Note       Name:  Kaiser Miles   Age:  55 y.o.  :  1970                                          MRN:  [id number]         Date:  2025      Surgeon: Surgeon(s):  Toño Porras MD    Procedure: Procedure(s):  CYSTOSCOPY PROSTATIC URETHRAL LIFT    Medications prior to admission:   Prior to Admission medications    Medication Sig Start Date End Date Taking? Authorizing Provider   tamsulosin (FLOMAX) 0.4 MG capsule TAKE 1 CAPSULE BY MOUTH EVERY DAY IN THE MORNING AND AT BEDTIME 25  Yes Toño Porras MD   Multiple Vitamin (MULTIVITAMIN ADULT PO) Take 1 tablet by mouth daily   Yes Padma Domínguez MD   MAGNESIUM CITRATE PO Take 250 mg by mouth daily   Yes Padma Domínguez MD   ferrous sulfate (IRON 325) 325 (65 Fe) MG tablet Take 1 tablet by mouth daily (with breakfast)   Yes Padma Domínguez MD   Potassium Gluconate (SD POTASSIUM GLUCONATE) 595 (99 K) MG TABS Take 1 tablet by mouth daily   Yes Padma Domínguez MD   finasteride (PROSCAR) 5 MG tablet Take 1 tablet by mouth daily 25  Yes Toño Porras MD   sildenafil (VIAGRA) 100 MG tablet Take 1 tablet by mouth daily as needed for Erectile Dysfunction 25  Yes Kayla Christian PA   lansoprazole (PREVACID) 30 MG delayed release capsule Take 1 capsule by mouth every other day TAKE 1 CAPSULE BY MOUTH EVERY OTHER DAY 25  Yes Kayla Christian PA   SUMAtriptan (IMITREX) 100 MG tablet Take one prn migraine. May repeat in two hours if needed. May take with two Advil Liquigels. Max daily dose= 2 tabs 25  Yes Kayla Christian PA   silodosin (RAPAFLO) 8 MG CAPS Take 1 capsule by mouth every evening 10/22/24  Yes Toño Porras MD   baclofen (LIORESAL) 10 MG tablet 1 tablet as needed 24  Yes Padma Domínguez MD   cloNIDine (CATAPRES) 0.1 MG tablet Take 1 tablet by mouth at bedtime 22  Yes Padma Domínguez MD

## 2025-07-01 NOTE — H&P
[facility] H&P Note                                           07/01/25     Patient: Kaiser Miles  MRN: [id number]    Admission Date:  7/1/2025, 0  Admission Diagnosis: Hyperplasia of prostate with lower urinary tract symptoms (LUTS) [N40.1]  Reason for Consult: BPH/LUTS    ASSESSMENT: 55 y.o. male with refractory BPH/LUTS who presents for urolift today.     PLAN:  -To OR for cystoscopy, urolift  -Consented  -Antibiotic on call to OR  -NPO for procedure.       __________________________________________________________________________________    HPI:     Kaisre Miles is a 55 y.o. male with refractory BPH/LUTS who presents for urolift today.     No changes in medical history since last seen by me.  NPO for procedure.     Past Medical History:  Past Medical History:   Diagnosis Date    Anemia     Anxiety     Cancer (HCC)     testicular    Chronic pain     right shoulder, back, both hips     Depression     Early awakening     woke up during right shoulder surgery    GERD (gastroesophageal reflux disease)     controlled with meds    Insomnia     Kidney stone     Migraine     Nicotine vapor product user        Past Surgical History:  Past Surgical History:   Procedure Laterality Date    COLONOSCOPY N/A 4/15/2024    COLONOSCOPY POLYPECTOMY performed by Cherie Vallejo MD at [facility] ENDOSCOPY    COLONOSCOPY N/A 5/20/2024    COLONOSCOPY POLYPECTOMY SNARE BIOPSY performed by Cherie Vallejo MD at [facility] ENDOSCOPY    HERNIA REPAIR Right 1978    KNEE ARTHROSCOPY Bilateral     ORTHOPEDIC SURGERY      artifical disc replacement at L5-S1    ORTHOPEDIC SURGERY      right hip impingement repair, microfracture, hip dysplasia    OTHER SURGICAL HISTORY Left     ankle    RADICAL ORCHIECTOMY Right 08/13/2019    SHOULDER ARTHROSCOPY Right 7/22/16    RIGHT SHOULDER ARTHROSCOPY, DISTAL CLAVICLE EXCISION, DECOMPRESSION, BICEPS TENOTOMY    SHOULDER ARTHROSCOPY Left     SLAP repair - and

## 2025-07-01 NOTE — ANESTHESIA POSTPROCEDURE EVALUATION
Department of Anesthesiology  Postprocedure Note    Patient: Kaiser Miles  MRN: [id number]  YOB: 1970  Date of evaluation: 7/1/2025    Procedure Summary       Date: 07/01/25 Room / Location: [facility] MAIN OR 01 CYSTO / [facility] MAIN OR    Anesthesia Start: 1502 Anesthesia Stop: 1557    Procedure: CYSTOSCOPY PROSTATIC URETHRAL LIFT Diagnosis:       Hyperplasia of prostate with lower urinary tract symptoms (LUTS)      (Hyperplasia of prostate with lower urinary tract symptoms (LUTS) [N40.1])    Providers: Toño Porras MD Responsible Provider: Leandra Gonsalves MD    Anesthesia Type: general ASA Status: 2            Anesthesia Type: No value filed.    Claude Phase I: Claude Score: 10    Claude Phase II: Claude Score: 10    Anesthesia Post Evaluation    Patient location during evaluation: PACU  Patient participation: complete - patient participated  Level of consciousness: awake and alert  Airway patency: patent  Nausea & Vomiting: no nausea  Cardiovascular status: hemodynamically stable  Respiratory status: acceptable  Hydration status: euvolemic  Pain management: adequate and satisfactory to patient        No notable events documented.

## 2025-07-30 ENCOUNTER — OFFICE VISIT (OUTPATIENT)
Dept: UROLOGY | Age: 55
End: 2025-07-30
Payer: COMMERCIAL

## 2025-07-30 DIAGNOSIS — N40.1 BENIGN PROSTATIC HYPERPLASIA WITH INCOMPLETE BLADDER EMPTYING: Primary | ICD-10-CM

## 2025-07-30 DIAGNOSIS — R39.14 BENIGN PROSTATIC HYPERPLASIA WITH INCOMPLETE BLADDER EMPTYING: Primary | ICD-10-CM

## 2025-07-30 DIAGNOSIS — C62.11 MALIGNANT NEOPLASM OF DESCENDED RIGHT TESTIS (HCC): ICD-10-CM

## 2025-07-30 DIAGNOSIS — E29.1 HYPOGONADISM IN MALE: ICD-10-CM

## 2025-07-30 LAB
BILIRUBIN, URINE, POC: NEGATIVE
BLOOD URINE, POC: NEGATIVE
GLUCOSE URINE, POC: NEGATIVE MG/DL
KETONES, URINE, POC: NEGATIVE MG/DL
LEUKOCYTE ESTERASE, URINE, POC: NEGATIVE
NITRITE, URINE, POC: NEGATIVE
PH, URINE, POC: 7 (ref 4.6–8)
PROTEIN,URINE, POC: NEGATIVE MG/DL
PVR, POC: 121 CC
SPECIFIC GRAVITY, URINE, POC: 1.01 (ref 1–1.03)
URINALYSIS CLARITY, POC: NORMAL
URINALYSIS COLOR, POC: NORMAL
UROBILINOGEN, POC: NORMAL MG/DL

## 2025-07-30 PROCEDURE — 99214 OFFICE O/P EST MOD 30 MIN: CPT | Performed by: UROLOGY

## 2025-07-30 PROCEDURE — 51798 US URINE CAPACITY MEASURE: CPT | Performed by: UROLOGY

## 2025-07-30 PROCEDURE — 81003 URINALYSIS AUTO W/O SCOPE: CPT | Performed by: UROLOGY

## 2025-07-30 ASSESSMENT — ENCOUNTER SYMPTOMS
EYE DISCHARGE: 0
BLOOD IN STOOL: 0
HEARTBURN: 0
NAUSEA: 0
SKIN LESIONS: 0
VOMITING: 0
DIARRHEA: 0
BACK PAIN: 0
CONSTIPATION: 0
INDIGESTION: 0
WHEEZING: 0
ABDOMINAL PAIN: 0
COUGH: 0
SHORTNESS OF BREATH: 0
EYE PAIN: 0

## 2025-07-30 NOTE — PROGRESS NOTES
[facility]  [address] [place] [address]   [address]  [place] [address]  [phone]    Kaiser Miles  : 1970    Chief Complaint   Patient presents with    Follow-up          HPI     Kaiser Miles is a 55 y.o. male    History of Present Illness  The patient is a 55-year-old male with a history of BPH with lower urinary tract symptoms, status post UroLift (7 implants) in 2025. He was on finasteride and Rapaflo, but his urinary symptoms did not respond to the medications. He returns for follow-up today after the UroLift procedure. He reports significant improvements, including minimal urgency and better urinary flow. He is very pleased with the procedure. Urinalysis shows no blood, nitrites, or leukocytes.    He reports significant improvement in his condition following the UroLift procedure, expressing satisfaction with the outcome. He experiences minimal urgency and has noticed an improvement in his urinary flow.   Now off of finasteride and rapaflo and doing nwell.     He has a history of pT1a seminoma, status post right radical orchiectomy in 2019, and is on annual surveillance for that, with his next set of imaging due in approximately 6 months.    He also has hypogonadism since the orchiectomy and is on testosterone 100 mg every 14 days, doing well with that, as well as sildenafil as needed for ED, which works well for him.    PAST SURGICAL HISTORY:  Status post UroLift (7 implants) in 2025.  Status post right radical orchiectomy in 2019.    Lab Results   Component Value Date    PSA 1.1 2025    PSA 1.1 2024    PSA 0.5 2021    PSA 0.6 2020    PSA 0.7 2020           Past Medical History:   Diagnosis Date    Anemia     Anxiety     Cancer (HCC)     testicular    Chronic pain     right shoulder, back, both hips     Depression     Early awakening     woke up during right shoulder surgery    GERD (gastroesophageal reflux disease)

## (undated) DEVICE — SOLUTION IRRIG 1000ML H2O PIC PLAS SHATTERPROOF CONTAINER

## (undated) DEVICE — SYRINGE, LUER SLIP, STERILE, 60ML: Brand: MEDLINE

## (undated) DEVICE — REM POLYHESIVE ADULT PATIENT RETURN ELECTRODE: Brand: VALLEYLAB

## (undated) DEVICE — INTENDED FOR TISSUE SEPARATION, AND OTHER PROCEDURES THAT REQUIRE A SHARP SURGICAL BLADE TO PUNCTURE OR CUT.: Brand: BARD-PARKER SAFETY BLADES SIZE 10, STERILE

## (undated) DEVICE — CANNULA NSL ORAL AD FOR CAPNOFLEX CO2 O2 AIRLFE

## (undated) DEVICE — Device

## (undated) DEVICE — GAUZE,SPONGE,4"X4",12PLY,WOVEN,NS,LF: Brand: MEDLINE

## (undated) DEVICE — SYRINGE MED 10ML LUERLOCK TIP W/O SFTY DISP

## (undated) DEVICE — ATHLETIC SUPPORTER LATEX FREE, LARGE

## (undated) DEVICE — GARMENT,MEDLINE,DVT,INT,CALF,MED, GEN2: Brand: MEDLINE

## (undated) DEVICE — SUTURE VCRL SZ 2-0 L36IN ABSRB UD L36MM CT-1 1/2 CIR J945H

## (undated) DEVICE — AMD ANTIMICROBIAL NON-ADHERENT PAD,0.2% POLYHEXAMETHYLENE BIGUANIDE HCI (PHMB): Brand: TELFA

## (undated) DEVICE — ENDOSCOPIC KIT 1.1+ OP4 CA DE 2 GWN AAMI LEVEL 3

## (undated) DEVICE — SHEET, T, LAPAROTOMY, STERILE: Brand: MEDLINE

## (undated) DEVICE — CONTAINER SPEC FRMLN 120ML --

## (undated) DEVICE — FORCEPS BX L240CM JAW DIA2.8MM L CAP W/ NDL MIC MESH TOOTH

## (undated) DEVICE — SYRINGE MEDICAL 3ML CLEAR PLASTIC STANDARD NON CONTROL LUERLOCK TIP DISPOSABLE

## (undated) DEVICE — BAG DRAIN UROLOGY GENESIS NS

## (undated) DEVICE — INTENDED FOR TISSUE SEPARATION, AND OTHER PROCEDURES THAT REQUIRE A SHARP SURGICAL BLADE TO PUNCTURE OR CUT.: Brand: BARD-PARKER SAFETY BLADES SIZE 15, STERILE

## (undated) DEVICE — DRAPE TWL SURG 16X26IN BLU ORB04] ALLCARE INC]

## (undated) DEVICE — TELFA NON-ADHERENT PADS PREPAK: Brand: TELFA

## (undated) DEVICE — KENDALL RADIOLUCENT FOAM MONITORING ELECTRODE RECTANGULAR SHAPE: Brand: KENDALL

## (undated) DEVICE — SUTURE PERMAHAND SZ 0 L18IN NONABSORBABLE BLK SILK BRAID A186H

## (undated) DEVICE — SINGLE PORT MANIFOLD: Brand: NEPTUNE 2

## (undated) DEVICE — BUTTON SWITCH PENCIL BLADE ELECTRODE, HOLSTER: Brand: EDGE

## (undated) DEVICE — CONNECTOR TBNG OD5-7MM O2 END DISP

## (undated) DEVICE — SUTURE PERMAHAND SZ 0 L30IN NONABSORBABLE BLK SILK BRAID A306H

## (undated) DEVICE — CONTAINER FORMALIN PREFILLED 10% NBF 60ML

## (undated) DEVICE — NEEDLE SYR 18GA L1.5IN RED PLAS HUB S STL BLNT FILL W/O

## (undated) DEVICE — SOLUTION IRRIG 3000ML H2O STRL BAG

## (undated) DEVICE — TUBING, SUCTION, 1/4" X 10', STRAIGHT: Brand: MEDLINE

## (undated) DEVICE — SOLUTION IV 1000ML 0.9% SOD CHL

## (undated) DEVICE — SPONGE GZ W4XL4IN COT 12 PLY TYP VII WVN C FLD DSGN

## (undated) DEVICE — LUBE JELLY FOIL PACK 1.4 OZ: Brand: MEDLINE INDUSTRIES, INC.

## (undated) DEVICE — SUPPORT SCROT L FOR 39-44IN WAIST NYL CONSTANT COMFORTABLE

## (undated) DEVICE — SINGLE-USE POLYPECTOMY SNARE: Brand: CAPTIVATOR

## (undated) DEVICE — ELECTRODE PT RET AD L9FT HI MOIST COND ADH HYDRGEL CORDED

## (undated) DEVICE — DRAPE SURG SHT UROLOGY TURP STRL

## (undated) DEVICE — BLADE ASSEMB CLP HAIR COARSE --

## (undated) DEVICE — SYR 10ML LUER LOK 1/5ML GRAD --

## (undated) DEVICE — INTENDED TO BE USED TO OCCLUDE, RETRACT AND IDENTIFY ARTERIES, VEINS, TENDONS AND NERVES IN SURGICAL PROCEDURES: Brand: STERION®  VESSEL LOOP

## (undated) DEVICE — BAG,DRAINAGE,4L,A/R TOWER,LL,SLIDE TAP: Brand: MEDLINE

## (undated) DEVICE — MASTISOL ADHESIVE LIQ 2/3ML

## (undated) DEVICE — AIRLIFE™ OXYGEN TUBING 7 FEET (2.1 M) CRUSH RESISTANT OXYGEN TUBING, VINYL TIPPED: Brand: AIRLIFE™

## (undated) DEVICE — KENDALL SCD EXPRESS SLEEVES, KNEE LENGTH, MEDIUM: Brand: KENDALL SCD

## (undated) DEVICE — TRAY PREP DRY W/ PREM GLV 2 APPL 6 SPNG 2 UNDPD 1 OVERWRAP

## (undated) DEVICE — SURGICAL PROCEDURE PACK BASIC ST FRANCIS

## (undated) DEVICE — CATHETER F BLLN 5CC 18FR 2 W HYDRGEL COAT LESS TRAUM LUB

## (undated) DEVICE — SUTURE MCRYL SZ 4-0 L27IN ABSRB UD L19MM PS-2 1/2 CIR PRIM Y426H

## (undated) DEVICE — STANDARD HYPODERMIC NEEDLE,POLYPROPYLENE HUB: Brand: MONOJECT

## (undated) DEVICE — 3M™ TEGADERM™ TRANSPARENT FILM DRESSING FRAME STYLE, 1626W, 4 IN X 4-3/4 IN (10 CM X 12 CM), 50/CT 4CT/CASE: Brand: 3M™ TEGADERM™

## (undated) DEVICE — SUT SLK 0 30IN CT1 BLK --

## (undated) DEVICE — DRAIN WND PENRS RADPQ 0.25X12 --

## (undated) DEVICE — TRAP SPEC POLYP REM STRNR CLN DSGN MAGNIFYING WIND DISP

## (undated) DEVICE — APPLICATOR BNDG 1MM ADH PREMIERPRO EXOFIN

## (undated) DEVICE — SPONGE: SPECIALTY PEANUT XR 100/CS: Brand: MEDICAL ACTION INDUSTRIES

## (undated) DEVICE — AMD ANTIMICROBIAL GAUZE SPONGES,12 PLY USP TYPE VII, 0.2% POLYHEXAMETHYLENE BIGUANIDE HCI (PHMB): Brand: CURITY